# Patient Record
Sex: MALE | Race: WHITE | Employment: OTHER | URBAN - METROPOLITAN AREA
[De-identification: names, ages, dates, MRNs, and addresses within clinical notes are randomized per-mention and may not be internally consistent; named-entity substitution may affect disease eponyms.]

---

## 2018-08-17 ENCOUNTER — HOSPITAL ENCOUNTER (EMERGENCY)
Age: 58
Discharge: STILL ADMITTED OR ADMINISTRATIVE | End: 2018-08-17
Attending: EMERGENCY MEDICINE

## 2018-08-17 PROCEDURE — 75810000275 HC EMERGENCY DEPT VISIT NO LEVEL OF CARE

## 2020-07-13 ENCOUNTER — HOSPITAL ENCOUNTER (EMERGENCY)
Age: 60
Discharge: HOME | End: 2020-07-14
Attending: EMERGENCY MEDICINE
Payer: SELF-PAY

## 2020-07-13 VITALS
OXYGEN SATURATION: 97 % | HEART RATE: 78 BPM | RESPIRATION RATE: 14 BRPM | TEMPERATURE: 98.7 F | SYSTOLIC BLOOD PRESSURE: 159 MMHG | DIASTOLIC BLOOD PRESSURE: 92 MMHG

## 2020-07-13 DIAGNOSIS — R19.7 DIARRHEA, UNSPECIFIED TYPE: Primary | ICD-10-CM

## 2020-07-13 LAB
BASOPHILS # BLD: 0.1 K/UL (ref 0–0.1)
BASOPHILS NFR BLD: 1 % (ref 0–2)
DIFFERENTIAL METHOD BLD: NORMAL
EOSINOPHIL # BLD: 0.1 K/UL (ref 0–0.4)
EOSINOPHIL NFR BLD: 2 % (ref 0–5)
ERYTHROCYTE [DISTWIDTH] IN BLOOD BY AUTOMATED COUNT: 13.1 % (ref 11.6–14.5)
HCT VFR BLD AUTO: 45 % (ref 36–48)
HGB BLD-MCNC: 15.8 G/DL (ref 13–16)
LYMPHOCYTES # BLD: 1.9 K/UL (ref 0.9–3.6)
LYMPHOCYTES NFR BLD: 30 % (ref 21–52)
MCH RBC QN AUTO: 31.3 PG (ref 24–34)
MCHC RBC AUTO-ENTMCNC: 35.1 G/DL (ref 31–37)
MCV RBC AUTO: 89.1 FL (ref 74–97)
MONOCYTES # BLD: 0.5 K/UL (ref 0.05–1.2)
MONOCYTES NFR BLD: 8 % (ref 3–10)
NEUTS SEG # BLD: 3.9 K/UL (ref 1.8–8)
NEUTS SEG NFR BLD: 59 % (ref 40–73)
PLATELET # BLD AUTO: 145 K/UL (ref 135–420)
PMV BLD AUTO: 10.8 FL (ref 9.2–11.8)
RBC # BLD AUTO: 5.05 M/UL (ref 4.7–5.5)
WBC # BLD AUTO: 6.6 K/UL (ref 4.6–13.2)

## 2020-07-13 PROCEDURE — 99282 EMERGENCY DEPT VISIT SF MDM: CPT

## 2020-07-13 PROCEDURE — 83735 ASSAY OF MAGNESIUM: CPT

## 2020-07-13 PROCEDURE — 80053 COMPREHEN METABOLIC PANEL: CPT

## 2020-07-13 PROCEDURE — 74011250636 HC RX REV CODE- 250/636: Performed by: EMERGENCY MEDICINE

## 2020-07-13 PROCEDURE — 83690 ASSAY OF LIPASE: CPT

## 2020-07-13 PROCEDURE — 85025 COMPLETE CBC W/AUTO DIFF WBC: CPT

## 2020-07-13 PROCEDURE — 96360 HYDRATION IV INFUSION INIT: CPT

## 2020-07-13 RX ADMIN — SODIUM CHLORIDE 1000 ML: 900 INJECTION, SOLUTION INTRAVENOUS at 23:58

## 2020-07-14 LAB
ALBUMIN SERPL-MCNC: 3.5 G/DL (ref 3.4–5)
ALBUMIN/GLOB SERPL: 1 {RATIO} (ref 0.8–1.7)
ALP SERPL-CCNC: 130 U/L (ref 45–117)
ALT SERPL-CCNC: 21 U/L (ref 16–61)
ANION GAP SERPL CALC-SCNC: 11 MMOL/L (ref 3–18)
AST SERPL-CCNC: 17 U/L (ref 10–38)
BILIRUB SERPL-MCNC: 0.8 MG/DL (ref 0.2–1)
BUN SERPL-MCNC: 15 MG/DL (ref 7–18)
BUN/CREAT SERPL: 20 (ref 12–20)
CALCIUM SERPL-MCNC: 8.8 MG/DL (ref 8.5–10.1)
CHLORIDE SERPL-SCNC: 102 MMOL/L (ref 100–111)
CO2 SERPL-SCNC: 23 MMOL/L (ref 21–32)
CREAT SERPL-MCNC: 0.76 MG/DL (ref 0.6–1.3)
GLOBULIN SER CALC-MCNC: 3.6 G/DL (ref 2–4)
GLUCOSE SERPL-MCNC: 293 MG/DL (ref 74–99)
LIPASE SERPL-CCNC: 133 U/L (ref 73–393)
MAGNESIUM SERPL-MCNC: 2 MG/DL (ref 1.6–2.6)
POTASSIUM SERPL-SCNC: 4.3 MMOL/L (ref 3.5–5.5)
PROT SERPL-MCNC: 7.1 G/DL (ref 6.4–8.2)
SODIUM SERPL-SCNC: 136 MMOL/L (ref 136–145)

## 2020-07-14 PROCEDURE — 74011636637 HC RX REV CODE- 636/637: Performed by: EMERGENCY MEDICINE

## 2020-07-14 PROCEDURE — 96361 HYDRATE IV INFUSION ADD-ON: CPT

## 2020-07-14 RX ORDER — METFORMIN HYDROCHLORIDE 1000 MG/1
1000 TABLET ORAL 2 TIMES DAILY WITH MEALS
Qty: 30 TAB | Refills: 0 | Status: SHIPPED | OUTPATIENT
Start: 2020-07-14 | End: 2020-07-29

## 2020-07-14 RX ORDER — LISINOPRIL 20 MG/1
20 TABLET ORAL DAILY
Qty: 15 TAB | Refills: 0 | Status: SHIPPED | OUTPATIENT
Start: 2020-07-14 | End: 2020-07-29

## 2020-07-14 RX ORDER — GLIPIZIDE 10 MG/1
10 TABLET ORAL 2 TIMES DAILY
Qty: 30 TAB | Refills: 0 | Status: SHIPPED | OUTPATIENT
Start: 2020-07-14 | End: 2020-07-29

## 2020-07-14 RX ADMIN — HUMAN INSULIN 2 UNITS: 100 INJECTION, SOLUTION SUBCUTANEOUS at 00:59

## 2020-07-14 NOTE — ED PROVIDER NOTES
[facility] DEPARTMENT HISTORY AND PHYSICAL EXAM    11:55 PM  Date: 7/13/2020  Patient Name: Clint Madera [facility]    History of Presenting Illness     Chief Complaint   Patient presents with    Diarrhea        History Provided By: Patient    HPI: Sugey Cramer is a 61 y.o. male with history of diabetes and hypertension. Patient is homeless and has been off of his medication for about 3 months. He is here today complaining of diarrhea for the past 2 days. Abdominal cramp associated with bowel movements. Otherwise denies abdominal pain. No history of nausea or vomiting. No fever or cough. Denies known sick contacts. He tried to go to the community clinic to get his medication refills but it was so busy that he got very anxious and could not stay. Location:  Severity:  Timing/course: Onset/Duration:     PCP: Dot Love MD    Past History     Past Medical History:  Past Medical History:   Diagnosis Date    Diabetes ([address]     Dystonia     Dystonia     Hypertension        Past Surgical History:  History reviewed. No pertinent surgical history. Family History:  History reviewed. No pertinent family history. Social History:  Social History     Tobacco Use    Smoking status: Never Smoker    Smokeless tobacco: Never Used   Substance Use Topics    Alcohol use: Not Currently    Drug use: Never       Allergies: Allergies   Allergen Reactions    Morphine Rash       Review of Systems   Review of Systems   Gastrointestinal: Positive for abdominal pain and diarrhea. All other systems reviewed and are negative. Physical Exam     Patient Vitals for the past 12 hrs:   Temp Pulse Resp BP SpO2   07/13/20 2257 98.7 °F (37.1 °C) 78 14 (!) 159/92 97 %       Physical Exam  Vitals signs and nursing note reviewed. Constitutional:       Appearance: Normal appearance. He is obese. HENT:      Head: Normocephalic and atraumatic. Eyes:      Extraocular Movements: Extraocular movements intact. Neck:      Musculoskeletal: Normal range of motion and neck supple. Cardiovascular:      Rate and Rhythm: Normal rate. Pulmonary:      Effort: Pulmonary effort is normal. No respiratory distress. Abdominal:      General: There is no distension. Palpations: Abdomen is soft. Tenderness: There is no abdominal tenderness. Musculoskeletal: Normal range of motion. General: No deformity. Skin:     General: Skin is warm and dry. Neurological:      General: No focal deficit present. Mental Status: He is alert and oriented to person, place, and time. Psychiatric:         Mood and Affect: Mood normal.         Behavior: Behavior normal.         Diagnostic Study Results     Labs -  Recent Results (from the past 12 hour(s))   CBC WITH AUTOMATED DIFF    Collection Time: 07/13/20 11:30 PM   Result Value Ref Range    WBC 6.6 4.6 - 13.2 K/uL    RBC 5.05 4.70 - 5.50 M/uL    HGB 15.8 13.0 - 16.0 g/dL    HCT 45.0 36.0 - 48.0 %    MCV 89.1 74.0 - 97.0 FL    MCH 31.3 24.0 - 34.0 PG    MCHC 35.1 31.0 - 37.0 g/dL    RDW 13.1 11.6 - 14.5 %    PLATELET 760 414 - 553 K/uL    MPV 10.8 9.2 - 11.8 FL    NEUTROPHILS 59 40 - 73 %    LYMPHOCYTES 30 21 - 52 %    MONOCYTES 8 3 - 10 %    EOSINOPHILS 2 0 - 5 %    BASOPHILS 1 0 - 2 %    ABS. NEUTROPHILS 3.9 1.8 - 8.0 K/UL    ABS. LYMPHOCYTES 1.9 0.9 - 3.6 K/UL    ABS. MONOCYTES 0.5 0.05 - 1.2 K/UL    ABS. EOSINOPHILS 0.1 0.0 - 0.4 K/UL    ABS. BASOPHILS 0.1 0.0 - 0.1 K/UL    DF AUTOMATED         Radiologic Studies -   No results found. Medical Decision Making     ED Course: Progress Notes, Reevaluation, and Consults:    11:55 PM Initial assessment performed. The patients presenting problems have been discussed, and they/their family are in agreement with the care plan formulated and outlined with them. I have encouraged them to ask questions as they arise throughout their visit.         Provider Notes (Medical Decision Making): 54-year-old male history of diabetes and hypertension presenting with diarrhea and abdominal cramps for 2 days. Been off of his medications for 3 months. He is well-appearing on exam with a benign abdomen. Could be viral infection versus hyperglycemia due to medication noncompliance. Low suspicion for COVID-19 due to the lack of other symptoms, however the patient is homeless. No testing at this point. Will get screening labs to evaluate his electrolyte and his glucose. Hydration and  consult for medication refill and follow-up. Procedures:     Critical Care Time:     Vital Signs-Reviewed the patient's vital signs. Reviewed pt's pulse ox reading. EKG: Interpreted by the EP. Time Interpreted:    Rate:    Rhythm:    Interpretation:   Comparison:     Records Reviewed: Nursing Notes (Time of Review: 11:55 PM)  -I am the first provider for this patient.  -I reviewed the vital signs, available nursing notes, past medical history, past surgical history, family history and social history. Current Outpatient Medications   Medication Sig Dispense Refill    metFORMIN (GLUCOPHAGE) 1,000 mg tablet Take 1,000 mg by mouth two (2) times daily (with meals).  glipiZIDE (GLUCOTROL) 10 mg tablet Take 10 mg by mouth two (2) times a day.  pioglitazone (ACTOS) 15 mg tablet Take 15 mg by mouth.  empagliflozin (JARDIANCE) 10 mg tablet Take 10 mg by mouth daily.  hydroCHLOROthiazide (HYDRODIURIL) 25 mg tablet Take 25 mg by mouth daily.  lisinopril (PRINIVIL, ZESTRIL) 20 mg tablet Take 20 mg by mouth daily.  baclofen (LIORESAL) 10 mg tablet Take 10 mg by mouth three (3) times daily.  sertraline (ZOLOFT) 50 mg tablet Take 50 mg by mouth daily.  gabapentin (GRALISE 30-DAY STARTER PACK) 300 mg (9)- 600 mg (69) Tb24 Take  by mouth.  gabapentin (NEURONTIN) 600 mg tablet Take 600 mg by mouth two (2) times a day. Clinical Impression     Clinical Impression: No diagnosis found.     Disposition: dc          This note was dictated utilizing voice recognition software which may lead to typographical errors. I apologize in advance if the situation occurs. If questions arise please do not hesitate to contact me or call our department.     Alvina Rooney MD  11:55 PM

## 2020-07-14 NOTE — DISCHARGE INSTRUCTIONS

## 2020-07-14 NOTE — ED TRIAGE NOTES
Pt states he has had excessive diarrhea since Saturday, cramps with the diarrhea but no other symptoms, denies CP, SOB, nausea, vomiting, states he is homeless and has been out of diabetes medication and testing supplies for months.

## 2023-07-13 ENCOUNTER — HOSPITAL ENCOUNTER (EMERGENCY)
Facility: HOSPITAL | Age: 63
Discharge: HOME | End: 2023-07-14

## 2023-07-13 ENCOUNTER — APPOINTMENT (OUTPATIENT)
Facility: HOSPITAL | Age: 63
End: 2023-07-13

## 2023-07-13 VITALS
SYSTOLIC BLOOD PRESSURE: 147 MMHG | HEIGHT: 74 IN | WEIGHT: 205 LBS | BODY MASS INDEX: 26.31 KG/M2 | OXYGEN SATURATION: 100 % | RESPIRATION RATE: 14 BRPM | HEART RATE: 86 BPM | TEMPERATURE: 98 F | DIASTOLIC BLOOD PRESSURE: 88 MMHG

## 2023-07-13 DIAGNOSIS — M54.41 ACUTE BILATERAL LOW BACK PAIN WITH BILATERAL SCIATICA: ICD-10-CM

## 2023-07-13 DIAGNOSIS — M51.36 DDD (DEGENERATIVE DISC DISEASE), LUMBAR: Primary | ICD-10-CM

## 2023-07-13 DIAGNOSIS — M54.42 ACUTE BILATERAL LOW BACK PAIN WITH BILATERAL SCIATICA: ICD-10-CM

## 2023-07-13 PROCEDURE — 72100 X-RAY EXAM L-S SPINE 2/3 VWS: CPT

## 2023-07-13 PROCEDURE — 6360000002 HC RX W HCPCS: Performed by: EMERGENCY MEDICINE

## 2023-07-13 PROCEDURE — 99284 EMERGENCY DEPT VISIT MOD MDM: CPT

## 2023-07-13 PROCEDURE — 96372 THER/PROPH/DIAG INJ SC/IM: CPT

## 2023-07-13 RX ORDER — KETOROLAC TROMETHAMINE 15 MG/ML
15 INJECTION, SOLUTION INTRAMUSCULAR; INTRAVENOUS
Status: COMPLETED | OUTPATIENT
Start: 2023-07-13 | End: 2023-07-13

## 2023-07-13 RX ADMIN — KETOROLAC TROMETHAMINE 15 MG: 15 INJECTION, SOLUTION INTRAMUSCULAR; INTRAVENOUS at 23:29

## 2023-07-13 ASSESSMENT — LIFESTYLE VARIABLES
HOW OFTEN DO YOU HAVE A DRINK CONTAINING ALCOHOL: NEVER
HOW MANY STANDARD DRINKS CONTAINING ALCOHOL DO YOU HAVE ON A TYPICAL DAY: PATIENT DOES NOT DRINK

## 2023-07-13 ASSESSMENT — ENCOUNTER SYMPTOMS: BACK PAIN: 1

## 2023-07-14 RX ORDER — LIDOCAINE 50 MG/G
1 PATCH TOPICAL DAILY
Qty: 10 PATCH | Refills: 0 | Status: SHIPPED | OUTPATIENT
Start: 2023-07-14 | End: 2023-07-24

## 2023-07-14 RX ORDER — OXYCODONE HYDROCHLORIDE AND ACETAMINOPHEN 5; 325 MG/1; MG/1
1 TABLET ORAL EVERY 6 HOURS PRN
Qty: 12 TABLET | Refills: 0 | Status: SHIPPED | OUTPATIENT
Start: 2023-07-14 | End: 2023-07-17

## 2023-07-14 RX ORDER — PREDNISONE 10 MG/1
TABLET ORAL
Qty: 21 EACH | Refills: 1 | Status: SHIPPED | OUTPATIENT
Start: 2023-07-14

## 2023-07-14 NOTE — ED PROVIDER NOTES
[facility] EMERGENCY DEPT  EMERGENCY DEPARTMENT ENCOUNTER      Pt Name: Amanda Espino  MRN: [id number]  [address] [facility] 1960  Date of evaluation: 7/13/2023  Provider: BRITANY Courtney    CHIEF COMPLAINT       Chief Complaint   Patient presents with    Back Pain         HISTORY OF PRESENT ILLNESS   (Location/Symptom, Timing/Onset, Context/Setting, Quality, Duration, Modifying Factors, Severity)  Note limiting factors. Amanda Espino is a 61 y.o. male who presents to the emergency department complaint of low back pain radiating down both legs. He has had this since about Sunday and multiple times he feels like he has just fallen suddenly because the pain is so sharp and debilitating. Denies any saddle anesthesia bowel incontinence urinary retention fever rash IV drug use. He has had cervical fusions previously but these were over 20 years ago. HPI    Nursing Notes were reviewed. REVIEW OF SYSTEMS    (2-9 systems for level 4, 10 or more for level 5)     Review of Systems   Constitutional:  Negative for fever and unexpected weight change. Genitourinary:  Negative for difficulty urinating. Musculoskeletal:  Positive for back pain and gait problem. Neurological:  Negative for weakness and numbness. Except as noted above the remainder of the review of systems was reviewed and negative. PAST MEDICAL HISTORY     Past Medical History:   Diagnosis Date    Diabetes ([address])     Dystonia     Dystonia     Hypertension          SURGICAL HISTORY     No past surgical history on file. CURRENT MEDICATIONS       Previous Medications    No medications on file       ALLERGIES     Morphine    FAMILY HISTORY     No family history on file.        SOCIAL HISTORY       Social History     Socioeconomic History    Marital status:    Tobacco Use    Smoking status: Never    Smokeless tobacco: Never   Substance and Sexual Activity    Alcohol use: Not Currently    Drug use: Never       SCREENINGS

## 2023-07-14 NOTE — ED NOTES
Pt states Monday he stood up to use the bathroom and states he experienced bilateral shooting pain down both legs and his legs almost gave out. Denies bowel or bladder incontinence.      Hx cervical fusion       Nannette Bender RN  07/13/23 2399

## 2023-07-14 NOTE — ED NOTES
Pt discharged via ambulatory to Home. Pt Verbalized understanding of discharge instructions. Vital signs stable.           Bartolo Rooney RN  07/14/23 0414

## 2023-08-08 ENCOUNTER — HOSPITAL ENCOUNTER (EMERGENCY)
Facility: HOSPITAL | Age: 63
Discharge: HOME | End: 2023-08-09
Attending: EMERGENCY MEDICINE

## 2023-08-08 VITALS
OXYGEN SATURATION: 97 % | TEMPERATURE: 97.7 F | DIASTOLIC BLOOD PRESSURE: 82 MMHG | WEIGHT: 198 LBS | BODY MASS INDEX: 25.42 KG/M2 | RESPIRATION RATE: 16 BRPM | SYSTOLIC BLOOD PRESSURE: 147 MMHG | HEART RATE: 79 BPM

## 2023-08-08 DIAGNOSIS — M54.42 ACUTE MIDLINE LOW BACK PAIN WITH BILATERAL SCIATICA: Primary | ICD-10-CM

## 2023-08-08 DIAGNOSIS — M54.41 ACUTE MIDLINE LOW BACK PAIN WITH BILATERAL SCIATICA: Primary | ICD-10-CM

## 2023-08-08 PROCEDURE — 99283 EMERGENCY DEPT VISIT LOW MDM: CPT

## 2023-08-08 ASSESSMENT — PAIN SCALES - GENERAL: PAINLEVEL_OUTOF10: 10

## 2023-08-08 ASSESSMENT — PAIN DESCRIPTION - LOCATION: LOCATION: BACK;LEG

## 2023-08-09 PROCEDURE — 6370000000 HC RX 637 (ALT 250 FOR IP): Performed by: EMERGENCY MEDICINE

## 2023-08-09 RX ORDER — OXYCODONE HYDROCHLORIDE AND ACETAMINOPHEN 5; 325 MG/1; MG/1
2 TABLET ORAL
Status: COMPLETED | OUTPATIENT
Start: 2023-08-09 | End: 2023-08-09

## 2023-08-09 RX ORDER — OXYCODONE HYDROCHLORIDE AND ACETAMINOPHEN 5; 325 MG/1; MG/1
1 TABLET ORAL EVERY 6 HOURS PRN
Qty: 8 TABLET | Refills: 0 | Status: SHIPPED | OUTPATIENT
Start: 2023-08-09 | End: 2023-08-12

## 2023-08-09 RX ADMIN — OXYCODONE AND ACETAMINOPHEN 2 TABLET: 325; 5 TABLET ORAL at 03:32

## 2023-08-09 NOTE — DISCHARGE INSTRUCTIONS
Call the [address] to establish primary care  Call to [facility] to establish primary care

## 2023-08-09 NOTE — ED PROVIDER NOTES
EMERGENCY DEPARTMENT HISTORY AND PHYSICAL EXAM    2:45 AM EDT seen at this time in super track room        Date: 8/8/2023  Patient Name: Andrew Robles [facility]    History of Presenting Illness     Chief Complaint   Patient presents with    Back Pain    Leg Pain         History Provided By: patient    Additional History (Context): Juliano Pratt is a 61 y.o. male presents with lower back pain for 5 weeks, had a fall, went down to his knees did not go any further. After a few weeks had an x-ray here that showed no acute process. He is continuing to have pain shooting down his legs and difficulty with walking. Previously he has had problems with his neck since the 1990s. But no problems with the lower back. He has no insurance, does see pain management, but requests an MRI. Does not have primary care. Kaylen Fernandes PCP: Joni Guy MD    Chief Complaint:   Duration:    Timing:    Location:   Quality:   Severity:   Modifying Factors:   Associated Symptoms:       Current Facility-Administered Medications   Medication Dose Route Frequency Provider Last Rate Last Admin    oxyCODONE-acetaminophen (PERCOCET) 5-325 MG per tablet 2 tablet  2 tablet Oral NOW Melissa Beaulieu MD         Current Outpatient Medications   Medication Sig Dispense Refill    oxyCODONE-acetaminophen (PERCOCET) 5-325 MG per tablet Take 1 tablet by mouth every 6 hours as needed for Pain for up to 3 days. Intended supply: 3 days. Take lowest dose possible to manage pain Max Daily Amount: 4 tablets 8 tablet 0    predniSONE 10 MG (21) TBPK Take 6 tablets on day 1; take 5 tablets on day 2; take 4 tablets on day 3; take 3 tablets on day 4; take 2 tablets on day 5; take 1 tablet on day 6. 21 each 1       Past History     Past Medical History:  Past Medical History:   Diagnosis Date    Diabetes ([address])     Dystonia     Dystonia     Hypertension        Past Surgical History:  History reviewed. No pertinent surgical history.     Family History:  History

## 2023-08-09 NOTE — ED NOTES
Pt d/cd to home awake, alert and in NAD. All questions answered.       Anat Bowling RN  08/09/23 2592

## 2024-01-12 ENCOUNTER — APPOINTMENT (OUTPATIENT)
Facility: HOSPITAL | Age: 64
End: 2024-01-12

## 2024-01-12 ENCOUNTER — HOSPITAL ENCOUNTER (EMERGENCY)
Facility: HOSPITAL | Age: 64
Discharge: HOME | End: 2024-01-12

## 2024-01-12 VITALS
HEART RATE: 91 BPM | WEIGHT: 215 LBS | TEMPERATURE: 97.3 F | DIASTOLIC BLOOD PRESSURE: 98 MMHG | OXYGEN SATURATION: 97 % | RESPIRATION RATE: 18 BRPM | BODY MASS INDEX: 27.59 KG/M2 | SYSTOLIC BLOOD PRESSURE: 155 MMHG | HEIGHT: 74 IN

## 2024-01-12 DIAGNOSIS — S20.211A CONTUSION OF RIB ON RIGHT SIDE, INITIAL ENCOUNTER: ICD-10-CM

## 2024-01-12 DIAGNOSIS — R16.1 SPLENOMEGALY: ICD-10-CM

## 2024-01-12 DIAGNOSIS — R91.1 PULMONARY NODULE: Primary | ICD-10-CM

## 2024-01-12 DIAGNOSIS — R16.0 HEPATOMEGALY: ICD-10-CM

## 2024-01-12 PROCEDURE — 71250 CT THORAX DX C-: CPT

## 2024-01-12 PROCEDURE — 99284 EMERGENCY DEPT VISIT MOD MDM: CPT

## 2024-01-12 PROCEDURE — 71046 X-RAY EXAM CHEST 2 VIEWS: CPT

## 2024-01-12 RX ORDER — LIDOCAINE 4 G/G
1 PATCH TOPICAL DAILY
Qty: 30 PATCH | Refills: 0 | Status: SHIPPED | OUTPATIENT
Start: 2024-01-12 | End: 2024-02-11

## 2024-01-12 RX ORDER — METHOCARBAMOL 500 MG/1
500 TABLET, FILM COATED ORAL 3 TIMES DAILY
Qty: 15 TABLET | Refills: 0 | Status: SHIPPED | OUTPATIENT
Start: 2024-01-12 | End: 2024-01-22

## 2024-01-12 ASSESSMENT — ENCOUNTER SYMPTOMS
VOMITING: 0
SHORTNESS OF BREATH: 0
COLOR CHANGE: 0
ABDOMINAL PAIN: 0
DIARRHEA: 0
RHINORRHEA: 0

## 2024-01-12 ASSESSMENT — PAIN SCALES - GENERAL: PAINLEVEL_OUTOF10: 5

## 2024-01-12 ASSESSMENT — PAIN - FUNCTIONAL ASSESSMENT: PAIN_FUNCTIONAL_ASSESSMENT: 0-10

## 2024-01-12 ASSESSMENT — PAIN DESCRIPTION - LOCATION: LOCATION: RIB CAGE

## 2024-01-12 NOTE — ED PROVIDER NOTES
EMERGENCY DEPARTMENT HISTORY AND PHYSICAL EXAM      Date: 1/12/2024  Patient Name: Henrry Martinez    History of Presenting Illness     Chief Complaint   Patient presents with    Fall    Rib Pain       History (Context): Henrry Martinez is a 63 y.o. male with significant past medical history of htn, DM presents ambulatory to the ED today.  Patient reports history of herniated disc and at times his right leg will \"give out\".  Patient reports this occurred today and he fell to his right side, hitting his right lower ribs on his toolbox.  Denies hitting head and LOC.  Denies taking blood thinners.  Patient ports increased pain with movement.  Denies CP, SOB, dizziness. Patient has not taken anything for symptoms.       PCP: Kahlil Harris MD    No current facility-administered medications for this encounter.     Current Outpatient Medications   Medication Sig Dispense Refill    lidocaine 4 % external patch Place 1 patch onto the skin daily 30 patch 0    methocarbamol (ROBAXIN) 500 MG tablet Take 1 tablet by mouth 3 times daily for 10 days 15 tablet 0    predniSONE 10 MG (21) TBPK Take 6 tablets on day 1; take 5 tablets on day 2; take 4 tablets on day 3; take 3 tablets on day 4; take 2 tablets on day 5; take 1 tablet on day 6. 21 each 1       Past History     Past Medical History:   Past Medical History:   Diagnosis Date    Diabetes (HCC)     Dystonia     Dystonia     Hypertension        Past Surgical History:  No past surgical history on file.    Family History:  No family history on file.    Social History:   Social History     Tobacco Use    Smoking status: Never    Smokeless tobacco: Never   Substance Use Topics    Alcohol use: Not Currently    Drug use: Never       Allergies:  Allergies   Allergen Reactions    Morphine Hives       PMH, PSH, family history, social history, allergies reviewed with the patient with significant items noted above.  Review of Systems   Review of Systems   Constitutional:   for lung CA.      2.  Hepatomegaly and splenomegaly.      XR CHEST (2 VW)    (Results Pending)         The laboratory results, imaging results, and other diagnostic exams were reviewed in the EMR.    Medical Decision Making   I am the first provider for this patient.    I reviewed the vital signs, available nursing notes, past medical history, past surgical history, family history and social history.    Vital Signs-Reviewed the patient's vital signs.         Records Reviewed: Personally, on initial evaluation    MDM:   DDX includes but is not limited to: Rib contusion vs fracture, Pneumothorax    Will obtain lab work and imaging for further evaluation of patients complaint. Will continue to monitor and evaluate patient while in the ED.      Orders as below:  Orders Placed This Encounter   Procedures    XR CHEST (2 VW)    CT CHEST WO CONTRAST        Henrry Martinez presents with complaint of right rib pain after trauma PTA.  On exam no overlying skin changes or deformity.  Normal oxygen saturation.  Denies hitting head and LOC.  Discussed incidental findings on CT scan with patient.  No rib fracture and will treat for rib contusion. At time of discharge, pt non-toxic appearing in NAD. Pt stable for prompt outpatient follow-up with PCP 1 to 2 days.  Patient given strict instructions to return if symptoms worsen.        ED Course:          Procedures:  Procedures         Documentation/Prior Results Review:  Old medical records.  Nursing notes.        Diagnosis and Disposition     CLINICAL IMPRESSION:  1. Pulmonary nodule    2. Hepatomegaly    3. Splenomegaly    4. Contusion of rib on right side, initial encounter         Medication List        START taking these medications      lidocaine 4 % external patch  Place 1 patch onto the skin daily     methocarbamol 500 MG tablet  Commonly known as: ROBAXIN  Take 1 tablet by mouth 3 times daily for 10 days            ASK your doctor about these medications

## 2024-01-12 NOTE — ED TRIAGE NOTES
Pt reports his leg gave out this am and he pushed himself off the truck landing on something hard. Pt c/o of right rib pain, ambulatory with cane to triage.

## 2024-01-26 ENCOUNTER — APPOINTMENT (OUTPATIENT)
Facility: HOSPITAL | Age: 64
End: 2024-01-26

## 2024-01-26 ENCOUNTER — HOSPITAL ENCOUNTER (EMERGENCY)
Facility: HOSPITAL | Age: 64
Discharge: HOME | End: 2024-01-26

## 2024-01-26 VITALS
SYSTOLIC BLOOD PRESSURE: 139 MMHG | RESPIRATION RATE: 16 BRPM | TEMPERATURE: 97.8 F | OXYGEN SATURATION: 98 % | WEIGHT: 217 LBS | DIASTOLIC BLOOD PRESSURE: 78 MMHG | BODY MASS INDEX: 27.85 KG/M2 | HEIGHT: 74 IN | HEART RATE: 82 BPM

## 2024-01-26 DIAGNOSIS — R39.11 URINARY HESITANCY: ICD-10-CM

## 2024-01-26 DIAGNOSIS — R73.9 HYPERGLYCEMIA: ICD-10-CM

## 2024-01-26 DIAGNOSIS — R60.0 LEG EDEMA: Primary | ICD-10-CM

## 2024-01-26 LAB
ALBUMIN SERPL-MCNC: 3.5 G/DL (ref 3.4–5)
ALBUMIN/GLOB SERPL: 1.1 (ref 0.8–1.7)
ALP SERPL-CCNC: 127 U/L (ref 45–117)
ALT SERPL-CCNC: 21 U/L (ref 16–61)
ANION GAP SERPL CALC-SCNC: 6 MMOL/L (ref 3–18)
APPEARANCE UR: CLEAR
AST SERPL-CCNC: 10 U/L (ref 10–38)
BACTERIA URNS QL MICRO: ABNORMAL /HPF
BASOPHILS # BLD: 0.1 K/UL (ref 0–0.1)
BASOPHILS NFR BLD: 1 % (ref 0–2)
BILIRUB SERPL-MCNC: 1.2 MG/DL (ref 0.2–1)
BILIRUB UR QL: NEGATIVE
BUN SERPL-MCNC: 22 MG/DL (ref 7–18)
BUN/CREAT SERPL: 29 (ref 12–20)
CALCIUM SERPL-MCNC: 9.2 MG/DL (ref 8.5–10.1)
CHLORIDE SERPL-SCNC: 104 MMOL/L (ref 100–111)
CO2 SERPL-SCNC: 25 MMOL/L (ref 21–32)
COLOR UR: YELLOW
CREAT SERPL-MCNC: 0.76 MG/DL (ref 0.6–1.3)
DIFFERENTIAL METHOD BLD: ABNORMAL
EOSINOPHIL # BLD: 0.1 K/UL (ref 0–0.4)
EOSINOPHIL NFR BLD: 1 % (ref 0–5)
EPITH CASTS URNS QL MICRO: ABNORMAL /LPF (ref 0–5)
ERYTHROCYTE [DISTWIDTH] IN BLOOD BY AUTOMATED COUNT: 13 % (ref 11.6–14.5)
GLOBULIN SER CALC-MCNC: 3.3 G/DL (ref 2–4)
GLUCOSE SERPL-MCNC: 331 MG/DL (ref 74–99)
GLUCOSE UR STRIP.AUTO-MCNC: >1000 MG/DL
HCT VFR BLD AUTO: 45.4 % (ref 36–48)
HGB BLD-MCNC: 16 G/DL (ref 13–16)
HGB UR QL STRIP: NEGATIVE
IMM GRANULOCYTES # BLD AUTO: 0.1 K/UL (ref 0–0.04)
IMM GRANULOCYTES NFR BLD AUTO: 2 % (ref 0–0.5)
KETONES UR QL STRIP.AUTO: 80 MG/DL
LEUKOCYTE ESTERASE UR QL STRIP.AUTO: NEGATIVE
LIPASE SERPL-CCNC: 40 U/L (ref 13–75)
LYMPHOCYTES # BLD: 1.5 K/UL (ref 0.9–3.6)
LYMPHOCYTES NFR BLD: 22 % (ref 21–52)
MCH RBC QN AUTO: 30.6 PG (ref 24–34)
MCHC RBC AUTO-ENTMCNC: 35.2 G/DL (ref 31–37)
MCV RBC AUTO: 86.8 FL (ref 78–100)
MONOCYTES # BLD: 0.4 K/UL (ref 0.05–1.2)
MONOCYTES NFR BLD: 6 % (ref 3–10)
NEUTS SEG # BLD: 4.5 K/UL (ref 1.8–8)
NEUTS SEG NFR BLD: 67 % (ref 40–73)
NITRITE UR QL STRIP.AUTO: NEGATIVE
NRBC # BLD: 0 K/UL (ref 0–0.01)
NRBC BLD-RTO: 0 PER 100 WBC
NT PRO BNP: 143 PG/ML (ref 0–900)
PH UR STRIP: 5.5 (ref 5–8)
PLATELET # BLD AUTO: 144 K/UL (ref 135–420)
PMV BLD AUTO: 9.7 FL (ref 9.2–11.8)
POTASSIUM SERPL-SCNC: 4.3 MMOL/L (ref 3.5–5.5)
PROT SERPL-MCNC: 6.8 G/DL (ref 6.4–8.2)
PROT UR STRIP-MCNC: 100 MG/DL
RBC # BLD AUTO: 5.23 M/UL (ref 4.35–5.65)
RBC #/AREA URNS HPF: ABNORMAL /HPF (ref 0–5)
SODIUM SERPL-SCNC: 135 MMOL/L (ref 136–145)
SP GR UR REFRACTOMETRY: >1.03 (ref 1–1.03)
TROPONIN I SERPL HS-MCNC: 6 NG/L (ref 0–78)
UROBILINOGEN UR QL STRIP.AUTO: 0.2 EU/DL (ref 0.2–1)
WBC # BLD AUTO: 6.7 K/UL (ref 4.6–13.2)
WBC URNS QL MICRO: ABNORMAL /HPF (ref 0–4)

## 2024-01-26 PROCEDURE — 71046 X-RAY EXAM CHEST 2 VIEWS: CPT

## 2024-01-26 PROCEDURE — 99285 EMERGENCY DEPT VISIT HI MDM: CPT

## 2024-01-26 PROCEDURE — 93005 ELECTROCARDIOGRAM TRACING: CPT | Performed by: PHYSICIAN ASSISTANT

## 2024-01-26 PROCEDURE — 85025 COMPLETE CBC W/AUTO DIFF WBC: CPT

## 2024-01-26 PROCEDURE — 84484 ASSAY OF TROPONIN QUANT: CPT

## 2024-01-26 PROCEDURE — 51798 US URINE CAPACITY MEASURE: CPT

## 2024-01-26 PROCEDURE — 80053 COMPREHEN METABOLIC PANEL: CPT

## 2024-01-26 PROCEDURE — 83880 ASSAY OF NATRIURETIC PEPTIDE: CPT

## 2024-01-26 PROCEDURE — 81001 URINALYSIS AUTO W/SCOPE: CPT

## 2024-01-26 PROCEDURE — 83690 ASSAY OF LIPASE: CPT

## 2024-01-26 PROCEDURE — 6360000004 HC RX CONTRAST MEDICATION: Performed by: PHYSICIAN ASSISTANT

## 2024-01-26 PROCEDURE — 74177 CT ABD & PELVIS W/CONTRAST: CPT

## 2024-01-26 RX ORDER — GABAPENTIN 900 MG/1
1800 TABLET, FILM COATED ORAL DAILY
COMMUNITY

## 2024-01-26 RX ORDER — LISINOPRIL 5 MG/1
5 TABLET ORAL DAILY
COMMUNITY

## 2024-01-26 RX ORDER — SERTRALINE HCL 100 MG
200 TABLET ORAL
COMMUNITY

## 2024-01-26 RX ORDER — TIZANIDINE 4 MG/1
TABLET ORAL
COMMUNITY
Start: 2024-01-20

## 2024-01-26 RX ADMIN — IOPAMIDOL 100 ML: 612 INJECTION, SOLUTION INTRAVENOUS at 17:31

## 2024-01-26 ASSESSMENT — ENCOUNTER SYMPTOMS
ABDOMINAL PAIN: 0
SHORTNESS OF BREATH: 0
VOMITING: 1
DIARRHEA: 0

## 2024-01-26 NOTE — ED PROVIDER NOTES
[facility] MEDICINE  [address] [address]  [phone]  Schedule an appointment as soon as possible for a visit       Urology of [address] [address] [phone]  Schedule an appointment as soon as possible for a visit             Medication List        ASK your doctor about these medications      Gralise 900 MG Tabs  Generic drug: Gabapentin (Once-Daily)     lidocaine 4 % external patch  Place 1 patch onto the skin daily     lisinopril 5 MG tablet  Commonly known as: PRINIVIL;ZESTRIL     metFORMIN 1000 MG tablet  Commonly known as: GLUCOPHAGE     tiZANidine 4 MG tablet  Commonly known as: ZANAFLEX     Zoloft 100 MG tablet  Generic drug: sertraline               Dictation disclaimer:  Please note that this dictation was completed with Captify, the computer voice recognition software.  Quite often unanticipated grammatical, syntax, homophones, and other interpretive errors are inadvertently transcribed by the computer software.  Please disregard these errors.  Please excuse any errors that have escaped final proofreading.       Brook Portillo PA  01/27/24 0859

## 2024-01-26 NOTE — ED TRIAGE NOTES
Patient to triage c/o BLE swelling, vomiting and decreased urine output that started yesterday. Denies CP or SOB.

## 2024-01-27 LAB
EKG ATRIAL RATE: 92 BPM
EKG DIAGNOSIS: NORMAL
EKG P AXIS: 66 DEGREES
EKG P-R INTERVAL: 152 MS
EKG Q-T INTERVAL: 340 MS
EKG QRS DURATION: 76 MS
EKG QTC CALCULATION (BAZETT): 420 MS
EKG R AXIS: 37 DEGREES
EKG T AXIS: 58 DEGREES
EKG VENTRICULAR RATE: 92 BPM

## 2024-01-27 PROCEDURE — 93010 ELECTROCARDIOGRAM REPORT: CPT | Performed by: INTERNAL MEDICINE

## 2024-01-27 NOTE — DISCHARGE INSTRUCTIONS
Follow-up with your primary care physician within 2 days for reassessment. Bring the results from this visit with you for their review. Return to the ED immediately for any new, worsening, or persistent symptoms, including fever, vomiting, or any other medical concerns.

## 2024-10-05 ENCOUNTER — APPOINTMENT (OUTPATIENT)
Facility: HOSPITAL | Age: 64
End: 2024-10-05

## 2024-10-05 ENCOUNTER — HOSPITAL ENCOUNTER (EMERGENCY)
Facility: HOSPITAL | Age: 64
Discharge: HOME | End: 2024-10-05
Attending: EMERGENCY MEDICINE

## 2024-10-05 VITALS
HEIGHT: 74 IN | HEART RATE: 90 BPM | RESPIRATION RATE: 18 BRPM | BODY MASS INDEX: 26.95 KG/M2 | OXYGEN SATURATION: 97 % | SYSTOLIC BLOOD PRESSURE: 134 MMHG | WEIGHT: 210 LBS | DIASTOLIC BLOOD PRESSURE: 67 MMHG | TEMPERATURE: 97.9 F

## 2024-10-05 DIAGNOSIS — L03.116 CELLULITIS OF LEFT LOWER EXTREMITY: Primary | ICD-10-CM

## 2024-10-05 PROCEDURE — 73590 X-RAY EXAM OF LOWER LEG: CPT

## 2024-10-05 PROCEDURE — 6370000000 HC RX 637 (ALT 250 FOR IP): Performed by: EMERGENCY MEDICINE

## 2024-10-05 PROCEDURE — 99283 EMERGENCY DEPT VISIT LOW MDM: CPT

## 2024-10-05 RX ORDER — CEPHALEXIN 500 MG/1
500 CAPSULE ORAL 4 TIMES DAILY
Qty: 28 CAPSULE | Refills: 0 | Status: SHIPPED | OUTPATIENT
Start: 2024-10-05 | End: 2024-10-12

## 2024-10-05 RX ORDER — SULFAMETHOXAZOLE/TRIMETHOPRIM 800-160 MG
1 TABLET ORAL EVERY 12 HOURS SCHEDULED
Status: DISCONTINUED | OUTPATIENT
Start: 2024-10-05 | End: 2024-10-05 | Stop reason: HOSPADM

## 2024-10-05 RX ORDER — SULFAMETHOXAZOLE/TRIMETHOPRIM 800-160 MG
1 TABLET ORAL 2 TIMES DAILY
Qty: 14 TABLET | Refills: 0 | Status: SHIPPED | OUTPATIENT
Start: 2024-10-05 | End: 2024-10-12

## 2024-10-05 RX ADMIN — SULFAMETHOXAZOLE AND TRIMETHOPRIM 1 TABLET: 800; 160 TABLET ORAL at 17:58

## 2024-10-05 RX ADMIN — CEPHALEXIN 500 MG: 250 CAPSULE ORAL at 17:58

## 2024-10-05 ASSESSMENT — PAIN SCALES - GENERAL: PAINLEVEL_OUTOF10: 8

## 2024-10-05 ASSESSMENT — PAIN DESCRIPTION - LOCATION: LOCATION: LEG

## 2024-10-05 ASSESSMENT — PAIN - FUNCTIONAL ASSESSMENT: PAIN_FUNCTIONAL_ASSESSMENT: 0-10

## 2024-10-05 ASSESSMENT — PAIN DESCRIPTION - ORIENTATION: ORIENTATION: LEFT

## 2024-10-05 NOTE — ED TRIAGE NOTES
Patient was at landfill taking load of debris and was hit to left shin with 6x6. Now with pain with ambulation, redness and swelling.

## 2024-10-15 NOTE — ED PROVIDER NOTES
[facility] EMERGENCY DEPT  EMERGENCY DEPARTMENT ENCOUNTER      Pt Name: Henrry Mratinez  MRN: [id number]  Birthdate 1960  Date of evaluation: 10/5/2024  Provider: HAWA GUAMAN MD  1:01 AM    CHIEF COMPLAINT       Chief Complaint   Patient presents with    Leg Injury    Leg Pain         HISTORY OF PRESENT ILLNESS    Henrry Martinez is a 64 y.o. male who presents to the emergency department     64-year-old male past medical history of diabetes and hypertension presents emergency department with left shin injury.  This occurred at work.  6 x 6 hit him in the left shin.  Will treat with medications as open area on his leg.    The history is provided by the patient. No  was used.       Nursing Notes were reviewed.    REVIEW OF SYSTEMS       Review of Systems   Skin:  Positive for wound.       Except as noted above the remainder of the review of systems was reviewed and negative.       PAST MEDICAL HISTORY     Past Medical History:   Diagnosis Date    Diabetes (HCC)     Dystonia     Dystonia     Hypertension          SURGICAL HISTORY     No past surgical history on file.      CURRENT MEDICATIONS       Discharge Medication List as of 10/5/2024  5:58 PM        CONTINUE these medications which have NOT CHANGED    Details   ZOLOFT 100 MG tablet Take 2 tablets by mouth, DAWHistorical Med      tiZANidine (ZANAFLEX) 4 MG tablet Historical Med      metFORMIN (GLUCOPHAGE) 1000 MG tablet Take 1 tablet by mouth 2 times daily (with meals)Historical Med      Gabapentin, Once-Daily, (GRALISE) 900 MG TABS Take 1,800 mg by mouth dailyHistorical Med      lisinopril (PRINIVIL;ZESTRIL) 5 MG tablet Take 1 tablet by mouth dailyHistorical Med             ALLERGIES     Morphine    FAMILY HISTORY     No family history on file.       SOCIAL HISTORY       Social History     Socioeconomic History    Marital status:    Tobacco Use    Smoking status: Never    Smokeless tobacco: Never   Substance and Sexual

## 2024-11-08 ENCOUNTER — HOSPITAL ENCOUNTER (INPATIENT)
Facility: HOSPITAL | Age: 64
LOS: 6 days | Discharge: HOME | DRG: 872 | End: 2024-11-16
Attending: STUDENT IN AN ORGANIZED HEALTH CARE EDUCATION/TRAINING PROGRAM | Admitting: STUDENT IN AN ORGANIZED HEALTH CARE EDUCATION/TRAINING PROGRAM

## 2024-11-08 ENCOUNTER — APPOINTMENT (OUTPATIENT)
Facility: HOSPITAL | Age: 64
DRG: 872 | End: 2024-11-08

## 2024-11-08 DIAGNOSIS — K51.00 PANCOLITIS (HCC): Primary | ICD-10-CM

## 2024-11-08 DIAGNOSIS — E11.65 HYPERGLYCEMIA DUE TO DIABETES MELLITUS (HCC): ICD-10-CM

## 2024-11-08 DIAGNOSIS — E86.0 DEHYDRATION: ICD-10-CM

## 2024-11-08 DIAGNOSIS — R19.7 DIARRHEA, UNSPECIFIED TYPE: ICD-10-CM

## 2024-11-08 PROBLEM — I10 HYPERTENSION: Status: ACTIVE | Noted: 2024-11-08

## 2024-11-08 PROBLEM — K52.9 COLITIS: Status: ACTIVE | Noted: 2024-11-08

## 2024-11-08 PROBLEM — G24.9 DYSTONIA: Status: ACTIVE | Noted: 2024-11-08

## 2024-11-08 PROBLEM — E11.9 TYPE 2 DIABETES MELLITUS, WITHOUT LONG-TERM CURRENT USE OF INSULIN (HCC): Status: ACTIVE | Noted: 2024-11-08

## 2024-11-08 PROBLEM — F32.A DEPRESSION: Chronic | Status: ACTIVE | Noted: 2024-11-08

## 2024-11-08 PROBLEM — F32.A DEPRESSION: Status: ACTIVE | Noted: 2024-11-08

## 2024-11-08 PROBLEM — R16.2 HEPATOSPLENOMEGALY: Status: ACTIVE | Noted: 2024-11-08

## 2024-11-08 PROBLEM — E11.9 TYPE 2 DIABETES MELLITUS, WITHOUT LONG-TERM CURRENT USE OF INSULIN (HCC): Chronic | Status: ACTIVE | Noted: 2024-11-08

## 2024-11-08 PROBLEM — G24.9 DYSTONIA: Chronic | Status: ACTIVE | Noted: 2024-11-08

## 2024-11-08 PROBLEM — R11.2 NAUSEA AND VOMITING: Status: ACTIVE | Noted: 2024-11-08

## 2024-11-08 PROBLEM — I10 HYPERTENSION: Chronic | Status: ACTIVE | Noted: 2024-11-08

## 2024-11-08 PROBLEM — K52.9 COLITIS: Status: RESOLVED | Noted: 2024-11-08 | Resolved: 2024-11-08

## 2024-11-08 LAB
ALBUMIN SERPL-MCNC: 3.4 G/DL (ref 3.4–5)
ALBUMIN/GLOB SERPL: 0.9 (ref 0.8–1.7)
ALP SERPL-CCNC: 81 U/L (ref 45–117)
ALT SERPL-CCNC: 21 U/L (ref 16–61)
ANION GAP SERPL CALC-SCNC: 14 MMOL/L (ref 3–18)
APPEARANCE UR: CLEAR
AST SERPL-CCNC: 12 U/L (ref 10–38)
BACTERIA URNS QL MICRO: ABNORMAL /HPF
BASOPHILS # BLD: 0.1 K/UL (ref 0–0.1)
BASOPHILS NFR BLD: 1 % (ref 0–2)
BILIRUB SERPL-MCNC: 1 MG/DL (ref 0.2–1)
BILIRUB UR QL: NEGATIVE
BUN SERPL-MCNC: 22 MG/DL (ref 7–18)
BUN/CREAT SERPL: 21 (ref 12–20)
CALCIUM SERPL-MCNC: 8.9 MG/DL (ref 8.5–10.1)
CHLORIDE SERPL-SCNC: 95 MMOL/L (ref 100–111)
CO2 SERPL-SCNC: 19 MMOL/L (ref 21–32)
COLOR UR: YELLOW
CREAT SERPL-MCNC: 1.04 MG/DL (ref 0.6–1.3)
DIFFERENTIAL METHOD BLD: ABNORMAL
EKG ATRIAL RATE: 104 BPM
EKG DIAGNOSIS: NORMAL
EKG P AXIS: 68 DEGREES
EKG P-R INTERVAL: 150 MS
EKG Q-T INTERVAL: 328 MS
EKG QRS DURATION: 72 MS
EKG QTC CALCULATION (BAZETT): 431 MS
EKG R AXIS: 29 DEGREES
EKG T AXIS: 86 DEGREES
EKG VENTRICULAR RATE: 104 BPM
EOSINOPHIL # BLD: 0 K/UL (ref 0–0.4)
EOSINOPHIL NFR BLD: 0 % (ref 0–5)
EPITH CASTS URNS QL MICRO: ABNORMAL /LPF (ref 0–5)
ERYTHROCYTE [DISTWIDTH] IN BLOOD BY AUTOMATED COUNT: 13.2 % (ref 11.6–14.5)
FLUAV RNA SPEC QL NAA+PROBE: NOT DETECTED
FLUBV RNA SPEC QL NAA+PROBE: NOT DETECTED
GLOBULIN SER CALC-MCNC: 3.9 G/DL (ref 2–4)
GLUCOSE SERPL-MCNC: 395 MG/DL (ref 74–99)
GLUCOSE UR STRIP.AUTO-MCNC: >1000 MG/DL
HCT VFR BLD AUTO: 50.8 % (ref 36–48)
HGB BLD-MCNC: 17.7 G/DL (ref 13–16)
HGB UR QL STRIP: NEGATIVE
IMM GRANULOCYTES # BLD AUTO: 0.2 K/UL (ref 0–0.04)
IMM GRANULOCYTES NFR BLD AUTO: 2 % (ref 0–0.5)
KETONES UR QL STRIP.AUTO: >160 MG/DL
LEUKOCYTE ESTERASE UR QL STRIP.AUTO: NEGATIVE
LIPASE SERPL-CCNC: 24 U/L (ref 13–75)
LYMPHOCYTES # BLD: 1.7 K/UL (ref 0.9–3.6)
LYMPHOCYTES NFR BLD: 15 % (ref 21–52)
MAGNESIUM SERPL-MCNC: 2.2 MG/DL (ref 1.6–2.6)
MCH RBC QN AUTO: 30.6 PG (ref 24–34)
MCHC RBC AUTO-ENTMCNC: 34.8 G/DL (ref 31–37)
MCV RBC AUTO: 87.7 FL (ref 78–100)
MONOCYTES # BLD: 0.6 K/UL (ref 0.05–1.2)
MONOCYTES NFR BLD: 5 % (ref 3–10)
MUCOUS THREADS URNS QL MICRO: ABNORMAL /LPF
NEUTS SEG # BLD: 8.4 K/UL (ref 1.8–8)
NEUTS SEG NFR BLD: 76 % (ref 40–73)
NITRITE UR QL STRIP.AUTO: NEGATIVE
NRBC # BLD: 0 K/UL (ref 0–0.01)
NRBC BLD-RTO: 0 PER 100 WBC
PH BLDV: 7.23 (ref 7.32–7.42)
PH UR STRIP: 5.5 (ref 5–8)
PLATELET # BLD AUTO: 235 K/UL (ref 135–420)
PMV BLD AUTO: 9.6 FL (ref 9.2–11.8)
POTASSIUM SERPL-SCNC: 4.5 MMOL/L (ref 3.5–5.5)
PROT SERPL-MCNC: 7.3 G/DL (ref 6.4–8.2)
PROT UR STRIP-MCNC: 30 MG/DL
RBC # BLD AUTO: 5.79 M/UL (ref 4.35–5.65)
RBC #/AREA URNS HPF: ABNORMAL /HPF (ref 0–5)
SARS-COV-2 RNA RESP QL NAA+PROBE: NOT DETECTED
SODIUM SERPL-SCNC: 128 MMOL/L (ref 136–145)
SOURCE: NORMAL
SP GR UR REFRACTOMETRY: >1.03 (ref 1–1.04)
UROBILINOGEN UR QL STRIP.AUTO: 0.2 EU/DL (ref 0.2–1)
WBC # BLD AUTO: 11 K/UL (ref 4.6–13.2)
WBC URNS QL MICRO: ABNORMAL /HPF (ref 0–5)

## 2024-11-08 PROCEDURE — 82800 BLOOD PH: CPT

## 2024-11-08 PROCEDURE — 96361 HYDRATE IV INFUSION ADD-ON: CPT

## 2024-11-08 PROCEDURE — 2580000003 HC RX 258: Performed by: INTERNAL MEDICINE

## 2024-11-08 PROCEDURE — 87506 IADNA-DNA/RNA PROBE TQ 6-11: CPT

## 2024-11-08 PROCEDURE — 80053 COMPREHEN METABOLIC PANEL: CPT

## 2024-11-08 PROCEDURE — 85025 COMPLETE CBC W/AUTO DIFF WBC: CPT

## 2024-11-08 PROCEDURE — 81001 URINALYSIS AUTO W/SCOPE: CPT

## 2024-11-08 PROCEDURE — 99285 EMERGENCY DEPT VISIT HI MDM: CPT

## 2024-11-08 PROCEDURE — 87636 SARSCOV2 & INF A&B AMP PRB: CPT

## 2024-11-08 PROCEDURE — 93005 ELECTROCARDIOGRAM TRACING: CPT | Performed by: STUDENT IN AN ORGANIZED HEALTH CARE EDUCATION/TRAINING PROGRAM

## 2024-11-08 PROCEDURE — 82272 OCCULT BLD FECES 1-3 TESTS: CPT

## 2024-11-08 PROCEDURE — 96374 THER/PROPH/DIAG INJ IV PUSH: CPT

## 2024-11-08 PROCEDURE — 6360000004 HC RX CONTRAST MEDICATION: Performed by: PHYSICIAN ASSISTANT

## 2024-11-08 PROCEDURE — 6360000002 HC RX W HCPCS: Performed by: PHYSICIAN ASSISTANT

## 2024-11-08 PROCEDURE — 6370000000 HC RX 637 (ALT 250 FOR IP): Performed by: PHYSICIAN ASSISTANT

## 2024-11-08 PROCEDURE — 96375 TX/PRO/DX INJ NEW DRUG ADDON: CPT

## 2024-11-08 PROCEDURE — G0378 HOSPITAL OBSERVATION PER HR: HCPCS

## 2024-11-08 PROCEDURE — 6360000002 HC RX W HCPCS: Performed by: INTERNAL MEDICINE

## 2024-11-08 PROCEDURE — 6370000000 HC RX 637 (ALT 250 FOR IP): Performed by: INTERNAL MEDICINE

## 2024-11-08 PROCEDURE — 93010 ELECTROCARDIOGRAM REPORT: CPT | Performed by: INTERNAL MEDICINE

## 2024-11-08 PROCEDURE — 2580000003 HC RX 258: Performed by: PHYSICIAN ASSISTANT

## 2024-11-08 PROCEDURE — 83690 ASSAY OF LIPASE: CPT

## 2024-11-08 PROCEDURE — 74177 CT ABD & PELVIS W/CONTRAST: CPT

## 2024-11-08 PROCEDURE — 83735 ASSAY OF MAGNESIUM: CPT

## 2024-11-08 PROCEDURE — 99222 1ST HOSP IP/OBS MODERATE 55: CPT | Performed by: INTERNAL MEDICINE

## 2024-11-08 RX ORDER — POTASSIUM CHLORIDE 7.45 MG/ML
10 INJECTION INTRAVENOUS PRN
Status: DISCONTINUED | OUTPATIENT
Start: 2024-11-08 | End: 2024-11-16 | Stop reason: HOSPADM

## 2024-11-08 RX ORDER — MAGNESIUM SULFATE IN WATER 40 MG/ML
2000 INJECTION, SOLUTION INTRAVENOUS PRN
Status: DISCONTINUED | OUTPATIENT
Start: 2024-11-08 | End: 2024-11-16 | Stop reason: HOSPADM

## 2024-11-08 RX ORDER — ONDANSETRON 4 MG/1
4 TABLET, ORALLY DISINTEGRATING ORAL EVERY 8 HOURS PRN
Status: DISCONTINUED | OUTPATIENT
Start: 2024-11-08 | End: 2024-11-16 | Stop reason: HOSPADM

## 2024-11-08 RX ORDER — FENTANYL CITRATE 50 UG/ML
25 INJECTION, SOLUTION INTRAMUSCULAR; INTRAVENOUS
Status: COMPLETED | OUTPATIENT
Start: 2024-11-08 | End: 2024-11-08

## 2024-11-08 RX ORDER — SODIUM CHLORIDE 0.9 % (FLUSH) 0.9 %
5-40 SYRINGE (ML) INJECTION EVERY 12 HOURS SCHEDULED
Status: DISCONTINUED | OUTPATIENT
Start: 2024-11-08 | End: 2024-11-16 | Stop reason: HOSPADM

## 2024-11-08 RX ORDER — 0.9 % SODIUM CHLORIDE 0.9 %
1000 INTRAVENOUS SOLUTION INTRAVENOUS ONCE
Status: COMPLETED | OUTPATIENT
Start: 2024-11-08 | End: 2024-11-08

## 2024-11-08 RX ORDER — IPRATROPIUM BROMIDE AND ALBUTEROL SULFATE 2.5; .5 MG/3ML; MG/3ML
1 SOLUTION RESPIRATORY (INHALATION) EVERY 4 HOURS PRN
Status: DISCONTINUED | OUTPATIENT
Start: 2024-11-08 | End: 2024-11-16 | Stop reason: HOSPADM

## 2024-11-08 RX ORDER — SODIUM CHLORIDE 9 MG/ML
INJECTION, SOLUTION INTRAVENOUS CONTINUOUS
Status: DISPENSED | OUTPATIENT
Start: 2024-11-08 | End: 2024-11-09

## 2024-11-08 RX ORDER — ACETAMINOPHEN 650 MG/1
650 SUPPOSITORY RECTAL EVERY 6 HOURS PRN
Status: DISCONTINUED | OUTPATIENT
Start: 2024-11-08 | End: 2024-11-16 | Stop reason: HOSPADM

## 2024-11-08 RX ORDER — ONDANSETRON 2 MG/ML
4 INJECTION INTRAMUSCULAR; INTRAVENOUS EVERY 6 HOURS PRN
Status: DISCONTINUED | OUTPATIENT
Start: 2024-11-08 | End: 2024-11-16 | Stop reason: HOSPADM

## 2024-11-08 RX ORDER — POTASSIUM CHLORIDE 1500 MG/1
40 TABLET, EXTENDED RELEASE ORAL PRN
Status: DISCONTINUED | OUTPATIENT
Start: 2024-11-08 | End: 2024-11-16 | Stop reason: HOSPADM

## 2024-11-08 RX ORDER — SODIUM CHLORIDE 9 MG/ML
INJECTION, SOLUTION INTRAVENOUS PRN
Status: DISCONTINUED | OUTPATIENT
Start: 2024-11-08 | End: 2024-11-16 | Stop reason: HOSPADM

## 2024-11-08 RX ORDER — HYDRALAZINE HYDROCHLORIDE 20 MG/ML
5 INJECTION INTRAMUSCULAR; INTRAVENOUS EVERY 6 HOURS PRN
Status: DISCONTINUED | OUTPATIENT
Start: 2024-11-08 | End: 2024-11-13

## 2024-11-08 RX ORDER — LABETALOL HYDROCHLORIDE 5 MG/ML
5 INJECTION, SOLUTION INTRAVENOUS
Status: DISCONTINUED | OUTPATIENT
Start: 2024-11-08 | End: 2024-11-13

## 2024-11-08 RX ORDER — ONDANSETRON 2 MG/ML
4 INJECTION INTRAMUSCULAR; INTRAVENOUS ONCE
Status: COMPLETED | OUTPATIENT
Start: 2024-11-08 | End: 2024-11-08

## 2024-11-08 RX ORDER — ENOXAPARIN SODIUM 100 MG/ML
40 INJECTION SUBCUTANEOUS DAILY
Status: DISCONTINUED | OUTPATIENT
Start: 2024-11-08 | End: 2024-11-16 | Stop reason: HOSPADM

## 2024-11-08 RX ORDER — ACETAMINOPHEN 325 MG/1
650 TABLET ORAL EVERY 6 HOURS PRN
Status: DISCONTINUED | OUTPATIENT
Start: 2024-11-08 | End: 2024-11-16 | Stop reason: HOSPADM

## 2024-11-08 RX ORDER — POLYETHYLENE GLYCOL 3350 17 G/17G
17 POWDER, FOR SOLUTION ORAL DAILY PRN
Status: DISCONTINUED | OUTPATIENT
Start: 2024-11-08 | End: 2024-11-16 | Stop reason: HOSPADM

## 2024-11-08 RX ORDER — MECOBALAMIN 5000 MCG
5 TABLET,DISINTEGRATING ORAL NIGHTLY PRN
Status: DISCONTINUED | OUTPATIENT
Start: 2024-11-08 | End: 2024-11-16 | Stop reason: HOSPADM

## 2024-11-08 RX ORDER — IOPAMIDOL 612 MG/ML
100 INJECTION, SOLUTION INTRAVASCULAR
Status: COMPLETED | OUTPATIENT
Start: 2024-11-08 | End: 2024-11-08

## 2024-11-08 RX ORDER — SODIUM CHLORIDE 0.9 % (FLUSH) 0.9 %
5-40 SYRINGE (ML) INJECTION PRN
Status: DISCONTINUED | OUTPATIENT
Start: 2024-11-08 | End: 2024-11-16 | Stop reason: HOSPADM

## 2024-11-08 RX ADMIN — IOPAMIDOL 100 ML: 612 INJECTION, SOLUTION INTRAVENOUS at 12:57

## 2024-11-08 RX ADMIN — FENTANYL CITRATE 25 MCG: 50 INJECTION, SOLUTION INTRAMUSCULAR; INTRAVENOUS at 12:22

## 2024-11-08 RX ADMIN — ONDANSETRON 4 MG: 4 TABLET, ORALLY DISINTEGRATING ORAL at 22:27

## 2024-11-08 RX ADMIN — INSULIN HUMAN 5 UNITS: 100 INJECTION, SOLUTION PARENTERAL at 13:22

## 2024-11-08 RX ADMIN — ONDANSETRON 4 MG: 2 INJECTION INTRAMUSCULAR; INTRAVENOUS at 11:56

## 2024-11-08 RX ADMIN — HYDRALAZINE HYDROCHLORIDE 5 MG: 20 INJECTION INTRAMUSCULAR; INTRAVENOUS at 21:22

## 2024-11-08 RX ADMIN — SODIUM CHLORIDE 1000 ML: 900 INJECTION, SOLUTION INTRAVENOUS at 11:56

## 2024-11-08 RX ADMIN — SODIUM CHLORIDE: 9 INJECTION, SOLUTION INTRAVENOUS at 23:33

## 2024-11-08 RX ADMIN — SODIUM CHLORIDE 1000 ML: 9 INJECTION, SOLUTION INTRAVENOUS at 17:49

## 2024-11-08 RX ADMIN — SODIUM CHLORIDE: 9 INJECTION, SOLUTION INTRAVENOUS at 19:15

## 2024-11-08 ASSESSMENT — PAIN DESCRIPTION - LOCATION
LOCATION: ABDOMEN
LOCATION: ABDOMEN

## 2024-11-08 ASSESSMENT — PAIN SCALES - GENERAL
PAINLEVEL_OUTOF10: 8
PAINLEVEL_OUTOF10: 0
PAINLEVEL_OUTOF10: 10

## 2024-11-08 ASSESSMENT — PAIN - FUNCTIONAL ASSESSMENT: PAIN_FUNCTIONAL_ASSESSMENT: 0-10

## 2024-11-08 NOTE — H&P
History and Physical    Patient: Henrry Martinez MRN: [id number]  SSN: [id number]    YOB: 1960  Age: 64 y.o.  Sex: male      Subjective:      Henrry Martinez is a 64 y.o. male who presents to [facility] ER with complaint of Vomiting and Diarrhea.  Patient reports that he has been having nausea, vomiting, and diarrhea since Monday (11/04/2024).  Patient reports that he was having >30 BMs/day when he was freely drinking fluids orally.  Patient states that he was treated for a Left Lower Extremity Cellulitis on 10/05/2024 and completed a course of oral antibiotics that were prescribed at that time [Sulfamethoxazole-Trimethoprim (a.k.a. Bactrim, a.k.a. SMX-TMP)].  Patient reports that he started having chills on Tuesday (11/05/2024).  Patient reports that he last had a BM today, but that may be because he stopped his oral intake.  Patient reports hat he does not have a history of Inflammatory Bowel Disease or Colitis.  Patient verifies his Home Medication List.  Patient denies fevers, dysuria, and cough.    In [facility] ER, Patient is noted to have Temperature 97.7°F, Heart Rate 106 bpm, Respiration Rate 22 bpm, Blood Pressure 184/97 mm Hg to 199/100 mm Hg, SpO2 99% on Room Air, WBC 11.0 K/uL, Hgb 17.7 g/dL, Neut% 76%, Neut# 8.4 K/uL, UA (Ketone+, Protein+, UTI-)Na+ 128 mmol/L, K+ 4.5 mmol/L, Cl- 95 mmol/L, Bicarbonate 19 mmol/L, BUN 22 mg/dL, Creatinine 1.04 mg/dL (Baseline Creatinine ~0.76 mg/dL???), eGFR 80, Glucose 395 mg/dL, Albumin 3.4 g/dL, Influenza A/B (-), SARS-CoV-2 (-), and Venous pH 7.23.  CT Abdomen/Pelvis w/ Contrast has been read by Radiologist to show \"1. Severe pancolitis.  -Trace free fluid is likely reactive  2. Hepatosplenomegaly.\"  Patient received IV Fentanyl 25 mcg, IV Regular Insulin 5 units, IV Ondansetron 4 mg and IV NS 1,000 mL in [facility] ER.    Patient is placed on Observation on [facility] Medical Unit for management of Severe Pancolitis with  pectus carinatum; No pectus excavatum  Cardiovascular:  (+) Borderline Tachycardic rate, regular rhythm without rubs, gallops, or murmurs  Respiratory:  Clear to Auscultation Bilaterally without wheezes, rales, or rhonchi; normal effort of breathing on Room Air  Abdominal:  Soft, non-tense, (+) Mild to Moderately Tender LLQ and Epigastrium, worse in LLQ; (+) Questionably Hyperactive BS present without guarding, rebound, or masses  :  Deferred  Extremities:  Pulses 2+ x4 without pitting edema, clubbing, or cyanosis  Musculoskeletal:  Strength 5/5 in BUE and BLE  Integument:  No rash on face, forearms, or legs  Neurological:  Alert & Ostensibly Oriented x4/4; No gross deficits of Visual Acuity, Eye Movement, Jaw Opening, Facial Expression, Hearing, Phonation, or Head Movement; No gross deficits of Tongue Movement or Slurring of Speech  Psychiatric:  Affect is appropriate; Language is present and fluent; Behavior is appropriate      Laboratory Studies:  All labs obtained in [facility] ER in the last 24 hours have been reviewed.       Images Reviewed:  CT ABDOMEN PELVIS W IV CONTRAST Additional Contrast? None  Order: [id number]  Status: Final result       Visible to patient: Yes (not seen)       Next appt: None    0 Result Notes  Details    Reading Physician Reading Date Result Priority   Ana Paula Rose MD  [phone] 11/8/2024      Narrative & Impression  EXAM: CT ABDOMEN AND PELVIS WITH CONTRAST     CLINICAL INDICATION/HISTORY: diffuse abd pain. Vomiting and diarrhea for 5 days.  Nausea. Abdominal pain.     COMPARISON: CT abdomen/pelvis 1/26/2024     TECHNIQUE:  CT abdomen and pelvis with IV contrast.   All CT scans at this facility are performed using dose optimization technique as  appropriate to the performed examination, to include automated exposure control,  adjustment of the mA and/or kV according to patient's size (including  appropriate matching for site-specific examinations), or use of an

## 2024-11-08 NOTE — PROGRESS NOTES
INTERIM UPDATE - 1618 EST on 11/08/2024    [facility] ([facility] ER Clinician calls requesting admission for a 64-year-old male who presents with Vomiting and Diarrhea.    [facility] ER Clinician reports that Patient has Pancolitis with Tachycardia (ostensibly due to Dehydration).    Patient reports that he has been having diarrhea \"every 15 mintues\" and endorses >12 BMs/day.    Plan:  Will admit Patient for Severe Pancolitis with Nausea and Vomiting.

## 2024-11-08 NOTE — ED PROVIDER NOTES
EMERGENCY DEPARTMENT HISTORY AND PHYSICAL EXAM    Date: 11/8/2024  Patient Name: Henrry Martinez    History of Presenting Illness     Chief Complaint   Patient presents with    Diarrhea    Vomiting         History Provided By: Patient       Additional History (Context): Henrry Martinez is a 64 y.o. male with a history of diabetes and hypertension who presents today for abdominal pain, nausea vomiting and diarrhea.  Patient reports that this has been going on for the past 5 days.  States that it did start after eating fried chicken from [employer] however reports he thought it would go away but it has not.  States that he was recently on antibiotics for cellulitis but denies any real foul-smelling stool.  Patient reports he has not tried any thing additional for this at home.  Denies any alcohol tobacco or drug abuse.  Denies any abdominal surgical history.  Reports he is up-to-date on his colonoscopy which was normal.      PCP: No primary care provider on file.    Current Facility-Administered Medications   Medication Dose Route Frequency Provider Last Rate Last Admin    sodium chloride 0.9 % bolus 1,000 mL  1,000 mL IntraVENous Once Jenifer Avina PA        0.9 % sodium chloride infusion   IntraVENous Continuous Jean Claude Reyes E, DO         Current Outpatient Medications   Medication Sig Dispense Refill    ZOLOFT 100 MG tablet Take 2 tablets by mouth      tiZANidine (ZANAFLEX) 4 MG tablet       metFORMIN (GLUCOPHAGE) 1000 MG tablet Take 1 tablet by mouth 2 times daily (with meals)      Gabapentin, Once-Daily, (GRALISE) 900 MG TABS Take 1,800 mg by mouth daily      lisinopril (PRINIVIL;ZESTRIL) 5 MG tablet Take 1 tablet by mouth daily         Past History     Past Medical History:  Past Medical History:   Diagnosis Date    Diabetes (HCC)     Dystonia     Dystonia     Hypertension        Past Surgical History:  No past surgical history on file.    Family History:  No family history on file.    Social

## 2024-11-09 PROBLEM — R16.2 HEPATOSPLENOMEGALY: Status: RESOLVED | Noted: 2024-11-08 | Resolved: 2024-11-09

## 2024-11-09 PROBLEM — E87.1 HYPONATREMIA: Status: ACTIVE | Noted: 2024-11-09

## 2024-11-09 PROBLEM — A41.9 SEPSIS WITHOUT ACUTE ORGAN DYSFUNCTION (HCC): Status: ACTIVE | Noted: 2024-11-09

## 2024-11-09 PROBLEM — I10 HYPERTENSION: Chronic | Status: RESOLVED | Noted: 2024-11-08 | Resolved: 2024-11-09

## 2024-11-09 LAB
ALBUMIN SERPL-MCNC: 2.7 G/DL (ref 3.4–5)
ALBUMIN/GLOB SERPL: 0.9 (ref 0.8–1.7)
ALP SERPL-CCNC: 65 U/L (ref 45–117)
ALT SERPL-CCNC: 19 U/L (ref 16–61)
ANION GAP SERPL CALC-SCNC: 13 MMOL/L (ref 3–18)
AST SERPL-CCNC: 10 U/L (ref 10–38)
BASOPHILS # BLD: 0.1 K/UL (ref 0–0.1)
BASOPHILS NFR BLD: 1 % (ref 0–2)
BILIRUB SERPL-MCNC: 0.8 MG/DL (ref 0.2–1)
BUN SERPL-MCNC: 19 MG/DL (ref 7–18)
BUN/CREAT SERPL: 24 (ref 12–20)
CALCIUM SERPL-MCNC: 8.4 MG/DL (ref 8.5–10.1)
CHLORIDE SERPL-SCNC: 105 MMOL/L (ref 100–111)
CO2 SERPL-SCNC: 17 MMOL/L (ref 21–32)
CREAT SERPL-MCNC: 0.8 MG/DL (ref 0.6–1.3)
CRP SERPL-MCNC: 0.6 MG/DL (ref 0–0.3)
DIFFERENTIAL METHOD BLD: ABNORMAL
EOSINOPHIL # BLD: 0.1 K/UL (ref 0–0.4)
EOSINOPHIL NFR BLD: 1 % (ref 0–5)
ERYTHROCYTE [DISTWIDTH] IN BLOOD BY AUTOMATED COUNT: 13.1 % (ref 11.6–14.5)
ERYTHROCYTE [SEDIMENTATION RATE] IN BLOOD: 11 MM/HR (ref 0–20)
GLOBULIN SER CALC-MCNC: 3.1 G/DL (ref 2–4)
GLUCOSE BLD STRIP.AUTO-MCNC: 254 MG/DL (ref 70–110)
GLUCOSE BLD STRIP.AUTO-MCNC: 267 MG/DL (ref 70–110)
GLUCOSE BLD STRIP.AUTO-MCNC: 284 MG/DL (ref 70–110)
GLUCOSE BLD STRIP.AUTO-MCNC: 287 MG/DL (ref 70–110)
GLUCOSE BLD STRIP.AUTO-MCNC: 331 MG/DL (ref 70–110)
GLUCOSE SERPL-MCNC: 278 MG/DL (ref 74–99)
HCT VFR BLD AUTO: 45.9 % (ref 36–48)
HEMOCCULT STL QL: NEGATIVE
HGB BLD-MCNC: 15.8 G/DL (ref 13–16)
IMM GRANULOCYTES # BLD AUTO: 0.2 K/UL (ref 0–0.04)
IMM GRANULOCYTES NFR BLD AUTO: 2 % (ref 0–0.5)
LYMPHOCYTES # BLD: 1.9 K/UL (ref 0.9–3.6)
LYMPHOCYTES NFR BLD: 21 % (ref 21–52)
MCH RBC QN AUTO: 29.9 PG (ref 24–34)
MCHC RBC AUTO-ENTMCNC: 34.4 G/DL (ref 31–37)
MCV RBC AUTO: 86.9 FL (ref 78–100)
MONOCYTES # BLD: 0.7 K/UL (ref 0.05–1.2)
MONOCYTES NFR BLD: 8 % (ref 3–10)
NEUTS SEG # BLD: 6.3 K/UL (ref 1.8–8)
NEUTS SEG NFR BLD: 69 % (ref 40–73)
NRBC # BLD: 0 K/UL (ref 0–0.01)
NRBC BLD-RTO: 0 PER 100 WBC
PLATELET # BLD AUTO: 200 K/UL (ref 135–420)
PMV BLD AUTO: 9.7 FL (ref 9.2–11.8)
POTASSIUM SERPL-SCNC: 3.7 MMOL/L (ref 3.5–5.5)
PROCALCITONIN SERPL-MCNC: 0.15 NG/ML
PROT SERPL-MCNC: 5.8 G/DL (ref 6.4–8.2)
RBC # BLD AUTO: 5.28 M/UL (ref 4.35–5.65)
SODIUM SERPL-SCNC: 135 MMOL/L (ref 136–145)
WBC # BLD AUTO: 9.1 K/UL (ref 4.6–13.2)

## 2024-11-09 PROCEDURE — 6370000000 HC RX 637 (ALT 250 FOR IP): Performed by: INTERNAL MEDICINE

## 2024-11-09 PROCEDURE — G0378 HOSPITAL OBSERVATION PER HR: HCPCS

## 2024-11-09 PROCEDURE — 96372 THER/PROPH/DIAG INJ SC/IM: CPT

## 2024-11-09 PROCEDURE — 82962 GLUCOSE BLOOD TEST: CPT

## 2024-11-09 PROCEDURE — 85652 RBC SED RATE AUTOMATED: CPT

## 2024-11-09 PROCEDURE — 99232 SBSQ HOSP IP/OBS MODERATE 35: CPT | Performed by: STUDENT IN AN ORGANIZED HEALTH CARE EDUCATION/TRAINING PROGRAM

## 2024-11-09 PROCEDURE — 2580000003 HC RX 258: Performed by: STUDENT IN AN ORGANIZED HEALTH CARE EDUCATION/TRAINING PROGRAM

## 2024-11-09 PROCEDURE — 96361 HYDRATE IV INFUSION ADD-ON: CPT

## 2024-11-09 PROCEDURE — 86140 C-REACTIVE PROTEIN: CPT

## 2024-11-09 PROCEDURE — 6370000000 HC RX 637 (ALT 250 FOR IP): Performed by: STUDENT IN AN ORGANIZED HEALTH CARE EDUCATION/TRAINING PROGRAM

## 2024-11-09 PROCEDURE — 80053 COMPREHEN METABOLIC PANEL: CPT

## 2024-11-09 PROCEDURE — 97161 PT EVAL LOW COMPLEX 20 MIN: CPT

## 2024-11-09 PROCEDURE — 84145 PROCALCITONIN (PCT): CPT

## 2024-11-09 PROCEDURE — 2580000003 HC RX 258: Performed by: INTERNAL MEDICINE

## 2024-11-09 PROCEDURE — 36415 COLL VENOUS BLD VENIPUNCTURE: CPT

## 2024-11-09 PROCEDURE — 6360000002 HC RX W HCPCS: Performed by: INTERNAL MEDICINE

## 2024-11-09 PROCEDURE — 96376 TX/PRO/DX INJ SAME DRUG ADON: CPT

## 2024-11-09 PROCEDURE — 85025 COMPLETE CBC W/AUTO DIFF WBC: CPT

## 2024-11-09 RX ORDER — INSULIN LISPRO 100 [IU]/ML
0-8 INJECTION, SOLUTION INTRAVENOUS; SUBCUTANEOUS
Status: DISCONTINUED | OUTPATIENT
Start: 2024-11-09 | End: 2024-11-16 | Stop reason: HOSPADM

## 2024-11-09 RX ORDER — LISINOPRIL 5 MG/1
5 TABLET ORAL DAILY
Status: DISCONTINUED | OUTPATIENT
Start: 2024-11-09 | End: 2024-11-16 | Stop reason: HOSPADM

## 2024-11-09 RX ORDER — INSULIN LISPRO 100 [IU]/ML
3 INJECTION, SOLUTION INTRAVENOUS; SUBCUTANEOUS
Status: DISCONTINUED | OUTPATIENT
Start: 2024-11-09 | End: 2024-11-10

## 2024-11-09 RX ORDER — GLUCAGON 1 MG
1 KIT INJECTION PRN
Status: DISCONTINUED | OUTPATIENT
Start: 2024-11-09 | End: 2024-11-16 | Stop reason: HOSPADM

## 2024-11-09 RX ORDER — SODIUM CHLORIDE, SODIUM LACTATE, POTASSIUM CHLORIDE, CALCIUM CHLORIDE 600; 310; 30; 20 MG/100ML; MG/100ML; MG/100ML; MG/100ML
INJECTION, SOLUTION INTRAVENOUS CONTINUOUS
Status: DISCONTINUED | OUTPATIENT
Start: 2024-11-09 | End: 2024-11-13

## 2024-11-09 RX ORDER — DEXTROSE MONOHYDRATE 100 MG/ML
INJECTION, SOLUTION INTRAVENOUS CONTINUOUS PRN
Status: DISCONTINUED | OUTPATIENT
Start: 2024-11-09 | End: 2024-11-16 | Stop reason: HOSPADM

## 2024-11-09 RX ORDER — INSULIN GLARGINE 100 [IU]/ML
12 INJECTION, SOLUTION SUBCUTANEOUS NIGHTLY
Status: DISCONTINUED | OUTPATIENT
Start: 2024-11-09 | End: 2024-11-10

## 2024-11-09 RX ADMIN — ONDANSETRON 4 MG: 2 INJECTION, SOLUTION INTRAMUSCULAR; INTRAVENOUS at 16:33

## 2024-11-09 RX ADMIN — INSULIN LISPRO 6 UNITS: 100 INJECTION, SOLUTION INTRAVENOUS; SUBCUTANEOUS at 20:43

## 2024-11-09 RX ADMIN — INSULIN GLARGINE 12 UNITS: 100 INJECTION, SOLUTION SUBCUTANEOUS at 20:43

## 2024-11-09 RX ADMIN — ENOXAPARIN SODIUM 40 MG: 100 INJECTION SUBCUTANEOUS at 16:30

## 2024-11-09 RX ADMIN — INSULIN LISPRO 4 UNITS: 100 INJECTION, SOLUTION INTRAVENOUS; SUBCUTANEOUS at 13:05

## 2024-11-09 RX ADMIN — LISINOPRIL 5 MG: 5 TABLET ORAL at 10:07

## 2024-11-09 RX ADMIN — SERTRALINE HYDROCHLORIDE 200 MG: 50 TABLET ORAL at 10:07

## 2024-11-09 RX ADMIN — INSULIN LISPRO 3 UNITS: 100 INJECTION, SOLUTION INTRAVENOUS; SUBCUTANEOUS at 16:29

## 2024-11-09 RX ADMIN — SODIUM CHLORIDE, PRESERVATIVE FREE 10 ML: 5 INJECTION INTRAVENOUS at 20:43

## 2024-11-09 RX ADMIN — ACETAMINOPHEN 325MG 650 MG: 325 TABLET ORAL at 16:33

## 2024-11-09 RX ADMIN — INSULIN LISPRO 4 UNITS: 100 INJECTION, SOLUTION INTRAVENOUS; SUBCUTANEOUS at 16:29

## 2024-11-09 RX ADMIN — SODIUM CHLORIDE, PRESERVATIVE FREE 10 ML: 5 INJECTION INTRAVENOUS at 10:11

## 2024-11-09 RX ADMIN — TIZANIDINE 4 MG: 4 TABLET ORAL at 16:33

## 2024-11-09 RX ADMIN — INSULIN LISPRO 4 UNITS: 100 INJECTION, SOLUTION INTRAVENOUS; SUBCUTANEOUS at 10:07

## 2024-11-09 RX ADMIN — SODIUM CHLORIDE, PRESERVATIVE FREE 10 ML: 5 INJECTION INTRAVENOUS at 06:03

## 2024-11-09 RX ADMIN — SODIUM CHLORIDE, POTASSIUM CHLORIDE, SODIUM LACTATE AND CALCIUM CHLORIDE: 600; 310; 30; 20 INJECTION, SOLUTION INTRAVENOUS at 20:42

## 2024-11-09 ASSESSMENT — PAIN SCALES - GENERAL
PAINLEVEL_OUTOF10: 7
PAINLEVEL_OUTOF10: 0
PAINLEVEL_OUTOF10: 0
PAINLEVEL_OUTOF10: 7
PAINLEVEL_OUTOF10: 0

## 2024-11-09 ASSESSMENT — PAIN DESCRIPTION - DESCRIPTORS: DESCRIPTORS: ACHING;DISCOMFORT

## 2024-11-09 ASSESSMENT — PAIN - FUNCTIONAL ASSESSMENT: PAIN_FUNCTIONAL_ASSESSMENT: PREVENTS OR INTERFERES SOME ACTIVE ACTIVITIES AND ADLS

## 2024-11-09 ASSESSMENT — PAIN DESCRIPTION - LOCATION: LOCATION: ABDOMEN

## 2024-11-09 ASSESSMENT — PAIN DESCRIPTION - ORIENTATION: ORIENTATION: RIGHT;LEFT

## 2024-11-09 NOTE — PROGRESS NOTES
4 Eyes Skin Assessment     NAME:  Henrry Martinez  YOB: 1960  MEDICAL RECORD NUMBER:  [id number]    The patient is being assessed for  Shift Handoff    I agree that at least one RN has performed a thorough Head to Toe Skin Assessment on the patient. ALL assessment sites listed below have been assessed.      Areas assessed by both nurses:    Head, Face, Ears, Shoulders, Back, Chest, Arms, Elbows, Hands, Sacrum. Buttock, Coccyx, Ischium, Legs. Feet and Heels, and Under Medical Devices         Does the Patient have a Wound? No noted wound(s)       Prasanna Prevention initiated by RN: No  Wound Care Orders initiated by RN: No    Pressure Injury (Stage 3,4, Unstageable, DTI, NWPT, and Complex wounds) if present, place Wound referral order by RN under : No    New Ostomies, if present place, Ostomy referral order under : No     Nurse 1 eSignature: Electronically signed by Hawa Fragoso RN on 11/9/24 at 6:56 PM EST    **SHARE this note so that the co-signing nurse can place an eSignature**    Nurse 2 eSignature: Electronically signed by Ivon Ibarra RN on 11/9/24 at 7:16 PM EST

## 2024-11-09 NOTE — PROGRESS NOTES
Eosinophils % 1 0 - 5 %    Basophils % 1 0 - 2 %    Immature Granulocytes % 2 (H) 0.0 - 0.5 %    Neutrophils Absolute 6.3 1.8 - 8.0 K/UL    Lymphocytes Absolute 1.9 0.9 - 3.6 K/UL    Monocytes Absolute 0.7 0.05 - 1.2 K/UL    Eosinophils Absolute 0.1 0.0 - 0.4 K/UL    Basophils Absolute 0.1 0.0 - 0.1 K/UL    Immature Granulocytes Absolute 0.2 (H) 0.00 - 0.04 K/UL    Differential Type AUTOMATED     POCT Glucose    Collection Time: 11/09/24  3:34 AM   Result Value Ref Range    POC Glucose 254 (H) 70 - 110 mg/dL   POCT Glucose    Collection Time: 11/09/24  6:58 AM   Result Value Ref Range    POC Glucose 267 (H) 70 - 110 mg/dL   POCT Glucose    Collection Time: 11/09/24 11:26 AM   Result Value Ref Range    POC Glucose 287 (H) 70 - 110 mg/dL   POCT Glucose    Collection Time: 11/09/24  3:07 PM   Result Value Ref Range    POC Glucose 284 (H) 70 - 110 mg/dL       Imaging:  CT ABDOMEN PELVIS W IV CONTRAST Additional Contrast? None    Result Date: 11/8/2024  EXAM: CT ABDOMEN AND PELVIS WITH CONTRAST CLINICAL INDICATION/HISTORY: diffuse abd pain. Vomiting and diarrhea for 5 days. Nausea. Abdominal pain. COMPARISON: CT abdomen/pelvis 1/26/2024 TECHNIQUE:  CT abdomen and pelvis with IV contrast. All CT scans at this facility are performed using dose optimization technique as appropriate to the performed examination, to include automated exposure control, adjustment of the mA and/or kV according to patient's size (including appropriate matching for site-specific examinations), or use of an iterative reconstruction technique. FINDINGS: Lower chest: Unremarkable. Liver: Mildly enlarged, measuring 19 cm in craniocaudal dimension. Biliary: Unremarkable. Pancreas: Unremarkable. Spleen: Enlarged, measuring up to 14 cm. Adrenal glands: Unremarkable. Kidneys: Unremarkable. Bladder: Unremarkable. Reproductive organs: Unremarkable. Bowel: Diffuse colon wall thickening, with surrounding stranding. Multiple colonic diverticula, but  4:29 PM

## 2024-11-09 NOTE — PLAN OF CARE
Problem: Chronic Conditions and Co-morbidities  Goal: Patient's chronic conditions and co-morbidity symptoms are monitored and maintained or improved  Outcome: Progressing  Flowsheets (Taken 11/9/2024 0839)  Care Plan - Patient's Chronic Conditions and Co-Morbidity Symptoms are Monitored and Maintained or Improved:   Collaborate with multidisciplinary team to address chronic and comorbid conditions and prevent exacerbation or deterioration   Monitor and assess patient's chronic conditions and comorbid symptoms for stability, deterioration, or improvement   Update acute care plan with appropriate goals if chronic or comorbid symptoms are exacerbated and prevent overall improvement and discharge     Problem: Discharge Planning  Goal: Discharge to home or other facility with appropriate resources  Outcome: Progressing  Flowsheets (Taken 11/9/2024 0839)  Discharge to home or other facility with appropriate resources: Identify barriers to discharge with patient and caregiver  Note: Patient heading towards discharge no bowel movement my shift.     Problem: Pain  Goal: Verbalizes/displays adequate comfort level or baseline comfort level  Outcome: Progressing  Flowsheets (Taken 11/9/2024 0839)  Verbalizes/displays adequate comfort level or baseline comfort level: Encourage patient to monitor pain and request assistance  Note: Patient has not stated any oain this shift

## 2024-11-09 NOTE — CONSULTS
diabetes mellitus, without long-term current use of insulin (HCC)    Dystonia    Depression    Hepatosplenomegaly       Home Medications:     Prior to Admission medications    Medication Sig Start Date End Date Taking? Authorizing Provider   ZOLOFT 100 MG tablet Take 2 tablets by mouth   Yes Bk Jefferson MD   tiZANidine (ZANAFLEX) 4 MG tablet  1/20/24  Yes Bk Jefferson MD   Gabapentin, Once-Daily, (GRALISE) 900 MG TABS Take 1,800 mg by mouth daily   Yes Bk Jefferson MD   lisinopril (PRINIVIL;ZESTRIL) 5 MG tablet Take 1 tablet by mouth daily   Yes Bk Jefferson MD   metFORMIN (GLUCOPHAGE) 1000 MG tablet Take 1 tablet by mouth 2 times daily (with meals)    ProviderBk MD       Review of Systems:     Constitutional: No fevers, chills, weight loss, fatigue., gen malaise    Skin: No rashes, pruritis, jaundice, ulcerations, erythema.   HENT: No headaches, nosebleeds, sinus pressure, rhinorrhea, sore throat.   Eyes: No visual changes, blurred vision, eye pain, photophobia, jaundice.   Cardiovascular: No chest pain, heart palpitations.   Respiratory: No cough, SOB, wheezing, chest discomfort, orthopnea.   Gastrointestinal: Neg unless noted otherwise in H&P   Genitourinary: No dysuria, bleeding, discharge, pyuria.   Musculoskeletal: No weakness, arthralgias, wasting.   Endo: No sweats.   Heme: No bruising, easy bleeding.   Allergies: As noted.   Neurological: Cranial nerves intact.  Alert and oriented. Gait not assessed.   Psychiatric:  No anxiety, depression, hallucinations.          BP (!) 152/80   Pulse 99   Temp 96.9 °F (36.1 °C) (Oral)   Resp 18   Ht 1.854 m (6' 1\")   Wt 95.3 kg (210 lb)   SpO2 96%   BMI 27.71 kg/m²     Physical Assessment:     constitutional: appearance: well developed, well nourished, obese habitus, no deformities, in some discomfort now   skin: inspection: no rashes, ulcers, icterus or other lesions; no clubbing or telangiectasias. palpation: no  \"GPT\", \"SGOT\", \"AP\", \"AML\", \"LPSE\"     Coags   No results for input(s): \"INR\", \"APTT\" in the last 72 hours.    Invalid input(s): \"PTP\"        LEI WOODARD MD.   [facility]  [phone]

## 2024-11-09 NOTE — PLAN OF CARE
Problem: Physical Therapy - Adult  Goal: By Discharge: Performs mobility at highest level of function for planned discharge setting.  See evaluation for individualized goals.  Description: Physical Therapy Goals:  Initiated 11/9/2024 to be met within 7-10 days.    1.  Patient will move from supine to sit and sit to supine  in bed with independence.    2.  Patient will transfer from bed to chair and chair to bed with independence using the least restrictive device.  3.  Patient will perform sit to stand with independence.  4.  Patient will ambulate with independence for 250 feet with the least restrictive device.     PLOF: Independent.      Outcome: Progressing     PHYSICAL THERAPY EVALUATION    Patient: Henrry Martinez (64 y.o. male)  Date: 11/9/2024  Primary Diagnosis: Dehydration [E86.0]  Colitis [K52.9]  Pancolitis (HCC) [K51.00]  Diarrhea, unspecified type [R19.7]  Hyperglycemia due to diabetes mellitus (HCC) [E11.65]       Precautions: Contact Precautions, Modified Diet, Fall Risk,  ,  ,  ,  ,  ,  ,      ASSESSMENT :  Pt presents with impaired functional mobility, decreased activity tolerance, and generalized weakness 2/2 admission. Pt sitting at EOB upon arrival, reports he has been ambulating to the bathroom, reports his abdomen is hurting and he has not had a BM today. Set up a chair for patient and educated on benefits of positional change and mobility to decrease risk of deconditioning as well as to maintain good GI motility. Pt ambulated in room with slow pace, no LOB noted yet pt reported feeling weaker than normal. Pt sitting EOB at end of session, RN updated.    DEFICITS/IMPAIRMENTS:    , Body Structures, Functions, Activity Limitations Requiring Skilled Therapeutic Intervention: Decreased functional mobility ;Decreased endurance;Decreased strength    Patient will benefit from skilled intervention to address the above impairments.  Patient's rehabilitation potential/Therapy Prognosis:

## 2024-11-09 NOTE — PROGRESS NOTES
4 Eyes Skin Assessment     NAME:  Henrry Martinez  YOB: 1960  MEDICAL RECORD NUMBER:  [id number]    The patient is being assessed for  Shift Handoff    I agree that at least one RN has performed a thorough Head to Toe Skin Assessment on the patient. ALL assessment sites listed below have been assessed.      Areas assessed by both nurses:    Head, Face, Ears, Shoulders, Back, Chest, Arms, Elbows, Hands, Sacrum. Buttock, Coccyx, Ischium, Legs. Feet and Heels, and Under Medical Devices         Does the Patient have a Wound? No noted wound(s)       Prasanna Prevention initiated by RN: Yes  Wound Care Orders initiated by RN: No    Pressure Injury (Stage 3,4, Unstageable, DTI, NWPT, and Complex wounds) if present, place Wound referral order by RN under : No    New Ostomies, if present place, Ostomy referral order under : No     Nurse 1 eSignature: Electronically signed by Martin Coulter RN on 11/8/24 at 8:04 PM EST    **SHARE this note so that the co-signing nurse can place an eSignature**    Nurse 2 eSignature: Electronically signed by Hawa Fragoso RN on 11/9/24 at 6:55 PM EST

## 2024-11-09 NOTE — PROGRESS NOTES
Assumed care of patient from SUNITHA Vergara (off going nurse). Patient alert and oriented. No apparent distress noted. Patient offers no concerns and can verbalize needs. Assessment to follow. Call bell within reach. Bed in lowest, locked position.

## 2024-11-09 NOTE — PLAN OF CARE
Problem: Chronic Conditions and Co-morbidities  Goal: Patient's chronic conditions and co-morbidity symptoms are monitored and maintained or improved  11/9/2024 1312 by Hawa Fragoso RN  Outcome: Progressing  11/9/2024 0839 by Jai Luna RN  Outcome: Progressing  Flowsheets (Taken 11/9/2024 0839)  Care Plan - Patient's Chronic Conditions and Co-Morbidity Symptoms are Monitored and Maintained or Improved:   Collaborate with multidisciplinary team to address chronic and comorbid conditions and prevent exacerbation or deterioration   Monitor and assess patient's chronic conditions and comorbid symptoms for stability, deterioration, or improvement   Update acute care plan with appropriate goals if chronic or comorbid symptoms are exacerbated and prevent overall improvement and discharge     Problem: Discharge Planning  Goal: Discharge to home or other facility with appropriate resources  11/9/2024 1312 by Hawa Fragoso RN  Outcome: Progressing  11/9/2024 0839 by Jai Luna RN  Outcome: Progressing  Flowsheets (Taken 11/9/2024 0839)  Discharge to home or other facility with appropriate resources: Identify barriers to discharge with patient and caregiver  Note: Patient heading towards discharge no bowel movement my shift.     Problem: Pain  Goal: Verbalizes/displays adequate comfort level or baseline comfort level  11/9/2024 1312 by Hawa Fragoso RN  Outcome: Progressing  11/9/2024 0839 by Jai Luna RN  Outcome: Progressing  Flowsheets (Taken 11/9/2024 0839)  Verbalizes/displays adequate comfort level or baseline comfort level: Encourage patient to monitor pain and request assistance  Note: Patient has not stated any oain this shift      Problem: Physical Therapy - Adult  Goal: By Discharge: Performs mobility at highest level of function for planned discharge setting.  See evaluation for individualized goals.  Description: Physical Therapy Goals:  Initiated 11/9/2024 to be met within 7-10 days.    1.

## 2024-11-10 PROBLEM — A41.9 SEPSIS (HCC): Status: ACTIVE | Noted: 2024-11-10

## 2024-11-10 LAB
ALBUMIN SERPL-MCNC: 2.5 G/DL (ref 3.4–5)
ALBUMIN/GLOB SERPL: 0.9 (ref 0.8–1.7)
ALP SERPL-CCNC: 57 U/L (ref 45–117)
ALT SERPL-CCNC: 14 U/L (ref 16–61)
ANION GAP SERPL CALC-SCNC: 10 MMOL/L (ref 3–18)
AST SERPL-CCNC: 10 U/L (ref 10–38)
BASOPHILS # BLD: 0.1 K/UL (ref 0–0.1)
BASOPHILS NFR BLD: 1 % (ref 0–2)
BILIRUB SERPL-MCNC: 0.9 MG/DL (ref 0.2–1)
BUN SERPL-MCNC: 18 MG/DL (ref 7–18)
BUN/CREAT SERPL: 23 (ref 12–20)
C COLI+JEJUNI TUF STL QL NAA+PROBE: NEGATIVE
CALCIUM SERPL-MCNC: 8.6 MG/DL (ref 8.5–10.1)
CHLORIDE SERPL-SCNC: 104 MMOL/L (ref 100–111)
CO2 SERPL-SCNC: 20 MMOL/L (ref 21–32)
CREAT SERPL-MCNC: 0.79 MG/DL (ref 0.6–1.3)
DIFFERENTIAL METHOD BLD: ABNORMAL
EC STX1+STX2 GENES STL QL NAA+PROBE: NEGATIVE
EOSINOPHIL # BLD: 0.1 K/UL (ref 0–0.4)
EOSINOPHIL NFR BLD: 1 % (ref 0–5)
ERYTHROCYTE [DISTWIDTH] IN BLOOD BY AUTOMATED COUNT: 13.1 % (ref 11.6–14.5)
ETEC ELTA+ESTB GENES STL QL NAA+PROBE: NEGATIVE
GLOBULIN SER CALC-MCNC: 2.8 G/DL (ref 2–4)
GLUCOSE BLD STRIP.AUTO-MCNC: 204 MG/DL (ref 70–110)
GLUCOSE BLD STRIP.AUTO-MCNC: 260 MG/DL (ref 70–110)
GLUCOSE BLD STRIP.AUTO-MCNC: 262 MG/DL (ref 70–110)
GLUCOSE BLD STRIP.AUTO-MCNC: 264 MG/DL (ref 70–110)
GLUCOSE SERPL-MCNC: 284 MG/DL (ref 74–99)
HCT VFR BLD AUTO: 46.1 % (ref 36–48)
HGB BLD-MCNC: 16 G/DL (ref 13–16)
IMM GRANULOCYTES # BLD AUTO: 0.2 K/UL (ref 0–0.04)
IMM GRANULOCYTES NFR BLD AUTO: 2 % (ref 0–0.5)
LYMPHOCYTES # BLD: 2.2 K/UL (ref 0.9–3.6)
LYMPHOCYTES NFR BLD: 24 % (ref 21–52)
MCH RBC QN AUTO: 30.3 PG (ref 24–34)
MCHC RBC AUTO-ENTMCNC: 34.7 G/DL (ref 31–37)
MCV RBC AUTO: 87.3 FL (ref 78–100)
MONOCYTES # BLD: 0.9 K/UL (ref 0.05–1.2)
MONOCYTES NFR BLD: 10 % (ref 3–10)
NEUTS SEG # BLD: 5.8 K/UL (ref 1.8–8)
NEUTS SEG NFR BLD: 63 % (ref 40–73)
NRBC # BLD: 0 K/UL (ref 0–0.01)
NRBC BLD-RTO: 0 PER 100 WBC
P SHIGELLOIDES DNA STL QL NAA+PROBE: NEGATIVE
PLATELET # BLD AUTO: 183 K/UL (ref 135–420)
PMV BLD AUTO: 9.5 FL (ref 9.2–11.8)
POTASSIUM SERPL-SCNC: 3.3 MMOL/L (ref 3.5–5.5)
PROT SERPL-MCNC: 5.3 G/DL (ref 6.4–8.2)
RBC # BLD AUTO: 5.28 M/UL (ref 4.35–5.65)
SALMONELLA SP SPAO STL QL NAA+PROBE: NEGATIVE
SHIGELLA SP+EIEC IPAH STL QL NAA+PROBE: NEGATIVE
SODIUM SERPL-SCNC: 134 MMOL/L (ref 136–145)
V CHOL+PARA+VUL DNA STL QL NAA+NON-PROBE: NEGATIVE
WBC # BLD AUTO: 9.2 K/UL (ref 4.6–13.2)
Y ENTEROCOL DNA STL QL NAA+NON-PROBE: NEGATIVE

## 2024-11-10 PROCEDURE — 6370000000 HC RX 637 (ALT 250 FOR IP): Performed by: INTERNAL MEDICINE

## 2024-11-10 PROCEDURE — 80053 COMPREHEN METABOLIC PANEL: CPT

## 2024-11-10 PROCEDURE — 87177 OVA AND PARASITES SMEARS: CPT

## 2024-11-10 PROCEDURE — 85025 COMPLETE CBC W/AUTO DIFF WBC: CPT

## 2024-11-10 PROCEDURE — G0378 HOSPITAL OBSERVATION PER HR: HCPCS

## 2024-11-10 PROCEDURE — 1100000000 HC RM PRIVATE

## 2024-11-10 PROCEDURE — 96376 TX/PRO/DX INJ SAME DRUG ADON: CPT

## 2024-11-10 PROCEDURE — 87449 NOS EACH ORGANISM AG IA: CPT

## 2024-11-10 PROCEDURE — 2580000003 HC RX 258: Performed by: STUDENT IN AN ORGANIZED HEALTH CARE EDUCATION/TRAINING PROGRAM

## 2024-11-10 PROCEDURE — 87324 CLOSTRIDIUM AG IA: CPT

## 2024-11-10 PROCEDURE — 36415 COLL VENOUS BLD VENIPUNCTURE: CPT

## 2024-11-10 PROCEDURE — 94761 N-INVAS EAR/PLS OXIMETRY MLT: CPT

## 2024-11-10 PROCEDURE — 97535 SELF CARE MNGMENT TRAINING: CPT

## 2024-11-10 PROCEDURE — 82962 GLUCOSE BLOOD TEST: CPT

## 2024-11-10 PROCEDURE — 87209 SMEAR COMPLEX STAIN: CPT

## 2024-11-10 PROCEDURE — 99232 SBSQ HOSP IP/OBS MODERATE 35: CPT | Performed by: STUDENT IN AN ORGANIZED HEALTH CARE EDUCATION/TRAINING PROGRAM

## 2024-11-10 PROCEDURE — 97165 OT EVAL LOW COMPLEX 30 MIN: CPT

## 2024-11-10 PROCEDURE — 2580000003 HC RX 258: Performed by: INTERNAL MEDICINE

## 2024-11-10 PROCEDURE — 6360000002 HC RX W HCPCS: Performed by: INTERNAL MEDICINE

## 2024-11-10 PROCEDURE — 6370000000 HC RX 637 (ALT 250 FOR IP): Performed by: STUDENT IN AN ORGANIZED HEALTH CARE EDUCATION/TRAINING PROGRAM

## 2024-11-10 RX ORDER — INSULIN GLARGINE 100 [IU]/ML
15 INJECTION, SOLUTION SUBCUTANEOUS NIGHTLY
Status: DISCONTINUED | OUTPATIENT
Start: 2024-11-10 | End: 2024-11-11

## 2024-11-10 RX ORDER — INSULIN LISPRO 100 [IU]/ML
5 INJECTION, SOLUTION INTRAVENOUS; SUBCUTANEOUS
Status: DISCONTINUED | OUTPATIENT
Start: 2024-11-11 | End: 2024-11-11

## 2024-11-10 RX ADMIN — Medication 5 MG: at 01:58

## 2024-11-10 RX ADMIN — INSULIN LISPRO 7 UNITS: 100 INJECTION, SOLUTION INTRAVENOUS; SUBCUTANEOUS at 16:56

## 2024-11-10 RX ADMIN — INSULIN LISPRO 3 UNITS: 100 INJECTION, SOLUTION INTRAVENOUS; SUBCUTANEOUS at 08:50

## 2024-11-10 RX ADMIN — INSULIN LISPRO 2 UNITS: 100 INJECTION, SOLUTION INTRAVENOUS; SUBCUTANEOUS at 21:34

## 2024-11-10 RX ADMIN — SERTRALINE HYDROCHLORIDE 200 MG: 50 TABLET ORAL at 08:49

## 2024-11-10 RX ADMIN — INSULIN GLARGINE 15 UNITS: 100 INJECTION, SOLUTION SUBCUTANEOUS at 20:51

## 2024-11-10 RX ADMIN — TIZANIDINE 4 MG: 4 TABLET ORAL at 01:58

## 2024-11-10 RX ADMIN — FIDAXOMICIN 200 MG: 200 TABLET, FILM COATED ORAL at 12:37

## 2024-11-10 RX ADMIN — INSULIN LISPRO 4 UNITS: 100 INJECTION, SOLUTION INTRAVENOUS; SUBCUTANEOUS at 12:27

## 2024-11-10 RX ADMIN — INSULIN LISPRO 3 UNITS: 100 INJECTION, SOLUTION INTRAVENOUS; SUBCUTANEOUS at 16:56

## 2024-11-10 RX ADMIN — ACETAMINOPHEN 325MG 650 MG: 325 TABLET ORAL at 12:32

## 2024-11-10 RX ADMIN — ONDANSETRON 4 MG: 2 INJECTION, SOLUTION INTRAMUSCULAR; INTRAVENOUS at 08:50

## 2024-11-10 RX ADMIN — SODIUM CHLORIDE, PRESERVATIVE FREE 10 ML: 5 INJECTION INTRAVENOUS at 08:51

## 2024-11-10 RX ADMIN — ENOXAPARIN SODIUM 40 MG: 100 INJECTION SUBCUTANEOUS at 16:56

## 2024-11-10 RX ADMIN — INSULIN LISPRO 3 UNITS: 100 INJECTION, SOLUTION INTRAVENOUS; SUBCUTANEOUS at 12:27

## 2024-11-10 RX ADMIN — ONDANSETRON 4 MG: 2 INJECTION, SOLUTION INTRAMUSCULAR; INTRAVENOUS at 19:01

## 2024-11-10 RX ADMIN — ONDANSETRON 4 MG: 2 INJECTION, SOLUTION INTRAMUSCULAR; INTRAVENOUS at 01:53

## 2024-11-10 RX ADMIN — INSULIN LISPRO 4 UNITS: 100 INJECTION, SOLUTION INTRAVENOUS; SUBCUTANEOUS at 08:51

## 2024-11-10 RX ADMIN — POTASSIUM CHLORIDE 40 MEQ: 1500 TABLET, EXTENDED RELEASE ORAL at 08:50

## 2024-11-10 RX ADMIN — LISINOPRIL 5 MG: 5 TABLET ORAL at 08:49

## 2024-11-10 RX ADMIN — FIDAXOMICIN 200 MG: 200 TABLET, FILM COATED ORAL at 20:52

## 2024-11-10 RX ADMIN — SODIUM CHLORIDE, PRESERVATIVE FREE 10 ML: 5 INJECTION INTRAVENOUS at 21:09

## 2024-11-10 RX ADMIN — SODIUM CHLORIDE, POTASSIUM CHLORIDE, SODIUM LACTATE AND CALCIUM CHLORIDE: 600; 310; 30; 20 INJECTION, SOLUTION INTRAVENOUS at 11:25

## 2024-11-10 ASSESSMENT — PAIN SCALES - GENERAL
PAINLEVEL_OUTOF10: 5
PAINLEVEL_OUTOF10: 7
PAINLEVEL_OUTOF10: 0
PAINLEVEL_OUTOF10: 0
PAINLEVEL_OUTOF10: 7
PAINLEVEL_OUTOF10: 7
PAINLEVEL_OUTOF10: 8
PAINLEVEL_OUTOF10: 7

## 2024-11-10 ASSESSMENT — PAIN DESCRIPTION - LOCATION
LOCATION: ABDOMEN

## 2024-11-10 ASSESSMENT — PAIN DESCRIPTION - ORIENTATION
ORIENTATION: MID
ORIENTATION: ANTERIOR

## 2024-11-10 ASSESSMENT — PAIN - FUNCTIONAL ASSESSMENT
PAIN_FUNCTIONAL_ASSESSMENT: ACTIVITIES ARE NOT PREVENTED
PAIN_FUNCTIONAL_ASSESSMENT: ACTIVITIES ARE NOT PREVENTED

## 2024-11-10 ASSESSMENT — PAIN DESCRIPTION - DESCRIPTORS
DESCRIPTORS: CRAMPING

## 2024-11-10 ASSESSMENT — PAIN DESCRIPTION - PAIN TYPE: TYPE: ACUTE PAIN

## 2024-11-10 NOTE — PROGRESS NOTES
4 Eyes Skin Assessment     NAME:  Henrry Martinez  YOB: 1960  MEDICAL RECORD NUMBER:  [id number]    The patient is being assessed for  Shift Handoff    I agree that at least one RN has performed a thorough Head to Toe Skin Assessment on the patient. ALL assessment sites listed below have been assessed.      Areas assessed by both nurses:    Head, Face, Ears, Shoulders, Back, Chest, Arms, Elbows, Hands, Sacrum. Buttock, Coccyx, Ischium, and Legs. Feet and Heels        Does the Patient have a Wound? No noted wound(s)       Prasanna Prevention initiated by RN: Yes  Wound Care Orders initiated by RN: No    Pressure Injury (Stage 3,4, Unstageable, DTI, NWPT, and Complex wounds) if present, place Wound referral order by RN under : No    New Ostomies, if present place, Ostomy referral order under : No     Nurse 1 eSignature: Electronically signed by Ivon Ibarra RN on 11/10/24 at 7:17 AM EST    **SHARE this note so that the co-signing nurse can place an eSignature**    Nurse 2 eSignature: Electronically signed by Hawa Fragoso RN on 11/10/24 at 8:08 AM EST

## 2024-11-10 NOTE — PROGRESS NOTES
[facility]  Progress Note    Patient: Henrry Martinez Age: 64 y.o. : 1960 MR#: [id number] SSN: [id number]  Date: 11/10/2024                 DVT Prophylaxis:  [x]Lovenox  []Hep SQ  []SCDs  []Coumadin   []On Heparin gtt []PO anticoagulant    Anticipated discharge: 2024 to home, pending clinical    Subjective:      The patient was seen and examined at the bedside.  He was resting in bed and was in no acute distress.  He said that he feels about the same as yesterday.  He continues to have multiple loose stools.  He also complained of diffuse abdominal pain.  He denied any nausea.  He is tolerating clear liquids so far.      Objective:   VS: /73   Pulse (!) 114   Temp 98.3 °F (36.8 °C) (Oral)   Resp 16   Ht 1.854 m (6' 1\")   Wt 95.3 kg (210 lb)   SpO2 98%   BMI 27.71 kg/m²    Tmax/24hrs: Temp (24hrs), Av.2 °F (36.8 °C), Min:97.6 °F (36.4 °C), Max:98.8 °F (37.1 °C)    Intake/Output Summary (Last 24 hours) at 11/10/2024 1756  Last data filed at 2024 2245  Gross per 24 hour   Intake 240 ml   Output --   Net 240 ml        PHYSICAL EXAM  General Appearance: No acute distress  HENT: normocephalic/atraumatic, moist mucus membranes  Neck: No JVD, supple  Lungs: CTA with normal respiratory effort  CV: RRR, no m/r/g  Abdomen: soft, diffusely tender but nondistended; bowel sounds slightly diminished; no organomegaly appreciated  : No Mason catheter; no suprapubic tenderness , no CVA tenderness  Extremities: no cyanosis, no peripheral edema  Neuro: No focal deficits, motor/sensory intact;   Skin: Facial flushing  Psych: appropriate mood and affect,      Current Facility-Administered Medications   Medication Dose Route Frequency    Fidaxomicin (DIFICID) tablet 200 mg  200 mg Oral BID    glucose chewable tablet 16 g  4 tablet Oral PRN    dextrose bolus 10% 125 mL  125 mL IntraVENous PRN    Or    dextrose bolus 10% 250 mL  250 mL  IntraVENous PRN    Glucagon Emergency SOLR 1 mg  1 mg SubCUTAneous PRN    dextrose 10 % infusion   IntraVENous Continuous PRN    insulin lispro (HUMALOG,ADMELOG) injection vial 0-8 Units  0-8 Units SubCUTAneous 4x Daily AC & HS    Gabapentin (Once-Daily) TABS 1,800 mg  1,800 mg Oral Daily    lisinopril (PRINIVIL;ZESTRIL) tablet 5 mg  5 mg Oral Daily    tiZANidine (ZANAFLEX) tablet 4 mg  4 mg Oral Q8H PRN    sertraline (ZOLOFT) tablet 200 mg  200 mg Oral Daily    insulin glargine (LANTUS) injection vial 12 Units  12 Units SubCUTAneous Nightly    insulin lispro (HUMALOG,ADMELOG) injection vial 3 Units  3 Units SubCUTAneous TID AC    lactated ringers infusion   IntraVENous Continuous    sodium chloride flush 0.9 % injection 5-40 mL  5-40 mL IntraVENous 2 times per day    sodium chloride flush 0.9 % injection 5-40 mL  5-40 mL IntraVENous PRN    0.9 % sodium chloride infusion   IntraVENous PRN    potassium chloride (KLOR-CON M) extended release tablet 40 mEq  40 mEq Oral PRN    Or    potassium bicarb-citric acid (EFFER-K) effervescent tablet 40 mEq  40 mEq Oral PRN    Or    potassium chloride 10 mEq/100 mL IVPB (Peripheral Line)  10 mEq IntraVENous PRN    magnesium sulfate 2000 mg in 50 mL IVPB premix  2,000 mg IntraVENous PRN    enoxaparin (LOVENOX) injection 40 mg  40 mg SubCUTAneous Daily    ondansetron (ZOFRAN-ODT) disintegrating tablet 4 mg  4 mg Oral Q8H PRN    Or    ondansetron (ZOFRAN) injection 4 mg  4 mg IntraVENous Q6H PRN    polyethylene glycol (GLYCOLAX) packet 17 g  17 g Oral Daily PRN    acetaminophen (TYLENOL) tablet 650 mg  650 mg Oral Q6H PRN    Or    acetaminophen (TYLENOL) suppository 650 mg  650 mg Rectal Q6H PRN    potassium chloride 10 mEq/100 mL IVPB (Peripheral Line)  10 mEq IntraVENous PRN    melatonin disintegrating tablet 5 mg  5 mg Oral Nightly PRN    ipratropium 0.5 mg-albuterol 2.5 mg (DUONEB) nebulizer solution 1 Dose  1 Dose Inhalation Q4H PRN    nitroglycerin (NITRO-BID) 2 % ointment 1

## 2024-11-10 NOTE — PLAN OF CARE
Problem: Occupational Therapy - Adult  Goal: By Discharge: Performs self-care activities at highest level of function for planned discharge setting.  See evaluation for individualized goals.  Description: Occupational Therapy Goals:  Initiated 11/10/2024 to be met within 7-10 days.      1.  Patient will perform grooming with modified independence while standing at the sink for > 5 min with Good balance.   2.  Patient will perform bathing with modified independence.  3.  Patient will perform lower body dressing with modified independence for seated and standing aspects.  4.  Patient will perform toilet transfers with modified independence.  5.  Patient will perform all aspects of toileting with modified independence.  6.  Patient will participate in upper extremity therapeutic exercise/activities with supervision/set-up for 8 minutes to improve endurance and UB strength needed for ADLs    7.  Patient will utilize energy conservation techniques during functional activities with verbal cues.    PLOF: Pt lives alone, independent.  Outcome: Progressing  OCCUPATIONAL THERAPY EVALUATION    Patient: Henrry Martinez (64 y.o. male)  Date: 11/10/2024  Primary Diagnosis: Dehydration [E86.0]  Colitis [K52.9]  Pancolitis (HCC) [K51.00]  Diarrhea, unspecified type [R19.7]  Hyperglycemia due to diabetes mellitus (HCC) [E11.65]  Sepsis (HCC) [A41.9]       Precautions: Contact Precautions, Fall Risk    ASSESSMENT :    Pt is agreeable to OT evaluation with encouragement, reports abdominal pain affecting his tolerance of activity. Pt demos difficulty reaching his feet for LB ADLs due to pain, required CGA and mult rest breaks for socks management using crossed LE technique. SBA for functional txfrs, due to lines management, decreased dynamic balance, and limited tolerance of activity. Pt educated on mult energy conservation techniques to utilize in home environment and while at the hospital, including pacing and deep breathing  stability, and prior level of function should also be taken into consideration.     SUBJECTIVE:   Patient stated, “I feel worse today.”    OBJECTIVE DATA SUMMARY:     Past Medical History:   Diagnosis Date    Diabetes mellitus, type 2 (HCC)     Dystonia     Hypertension    History reviewed. No pertinent surgical history.    Home Situation:   Social/Functional History  Lives With: Alone  Type of Home: House  Home Layout: One level  Home Access: Level entry  Bathroom Shower/Tub: Tub/Shower unit  Bathroom Toilet: Standard  Bathroom Accessibility: Accessible  Has the patient had two or more falls in the past year or any fall with injury in the past year?: No  Receives Help From: Friend(s), Neighbor  ADL Assistance: Independent  Homemaking Assistance: Independent  Ambulation Assistance: Independent  []  Right hand dominant   []  Left hand dominant    Cognitive/Behavioral Status:  Orientation  Overall Orientation Status: Within Functional Limits  Orientation Level: Oriented X4  Cognition  Overall Cognitive Status: WFL    Skin: visible skin intact  Edema: none noted    Vision/Perceptual:    Vision  Vision: Within Functional Limits        Coordination: BUE  Coordination: Generally decreased, functional        Balance:     Balance  Sitting: Intact  Standing: Impaired;Without support  Standing - Static: Good  Standing - Dynamic: Fair (plus)    Strength: BUE  Strength: Generally decreased, functional    Tone & Sensation: BUE  Tone: Normal  Sensation: Impaired (numbness in bilateral hands due to c/s issues chronic)    Range of Motion: BUE  AROM: Generally decreased, functional     Functional Mobility and Transfers for ADLs:  Bed Mobility:     Bed Mobility Training  Bed Mobility Training: Yes  Supine to Sit: Supervision  Sit to Supine: Supervision  Scooting: Supervision  Transfers:   Transfer Training  Sit to Stand: Stand-by assistance  Stand to Sit: Stand-by assistance    ADL Assessment:   Feeding: Modified independent

## 2024-11-10 NOTE — PROGRESS NOTES
[phone]    Gastroenterology follow up-Progress note    Impression:  1. Severe n/v and diarrhea with colitis on CT suggestive of infectious process, suspicious for c diff.  Sttol testing cancelled for unclear reasons.  2. Hyperglycemia   3. Hepatosplenomegaly, needs further eval once colitis process resolved     Plan:  1. We will resubmit the c diff specimen and empirically treat for c diff in light of the fact the pt is still quite symptomatic   2. Await GI pcr panel , would not use broad spectrum abxs at this point     Chief Complaint: severe diarrhea       Subjective:  Still having frequent loose non bloody stools with gen malaise.  No vomiting + nausea.  No fever no wbc.      ROS: Denies any fevers, chills, rash.       Patient Active Problem List   Diagnosis    Pancolitis (HCC)    Nausea and vomiting    Essential hypertension    Type 2 diabetes mellitus, without long-term current use of insulin (HCC)    Dystonia    Depression    Hyponatremia    Sepsis without acute organ dysfunction (HCC)         /71   Pulse (!) 112   Temp 98.8 °F (37.1 °C) (Oral)   Resp 18   Ht 1.854 m (6' 1\")   Wt 95.3 kg (210 lb)   SpO2 98%   BMI 27.71 kg/m²     General Appearance: ill appearing mild to moderate distress         Intake/Output Summary (Last 24 hours) at 11/10/2024 1233  Last data filed at 11/9/2024 2245  Gross per 24 hour   Intake 240 ml   Output --   Net 240 ml       CBC w/Diff    Lab Results   Component Value Date/Time    WBC 9.2 11/10/2024 04:53 AM    RBC 5.28 11/10/2024 04:53 AM    HGB 16.0 11/10/2024 04:53 AM    HCT 46.1 11/10/2024 04:53 AM    MCV 87.3 11/10/2024 04:53 AM    MCH 30.3 11/10/2024 04:53 AM    MCHC 34.7 11/10/2024 04:53 AM    RDW 13.1 11/10/2024 04:53 AM     11/10/2024 04:53 AM    MPV 9.5 11/10/2024 04:53 AM    No results found for: \"MONO\", \"BANDS\", \"BASOS\", \"METAS\", \"PRO\"   Basic Metabolic Profile   Recent Labs     11/08/24  1130 11/09/24  0324 11/10/24  0453   *   < >

## 2024-11-10 NOTE — PLAN OF CARE
Problem: Safety - Adult  Goal: Free from fall injury  Outcome: Progressing  Note: Pt ambulates to the bathroom w/o any assistive device. Gait observed to be steady.     Problem: Pain  Goal: Verbalizes/displays adequate comfort level or baseline comfort level  11/9/2024 2230 by Ivon Ibarra RN  Outcome: Progressing  11/9/2024 1312 by Hawa Fragoso RN  Outcome: Progressing  11/9/2024 0839 by Jai Luna RN  Outcome: Progressing  Flowsheets (Taken 11/9/2024 0839)  Verbalizes/displays adequate comfort level or baseline comfort level: Encourage patient to monitor pain and request assistance  Note: Patient has not stated any oain this shift      Problem: Chronic Conditions and Co-morbidities  Goal: Patient's chronic conditions and co-morbidity symptoms are monitored and maintained or improved  11/9/2024 2230 by Ivon Ibarra RN  Outcome: Progressing  11/9/2024 1312 by Hawa Fragoso RN  Outcome: Progressing  11/9/2024 0839 by Jai Luna RN  Outcome: Progressing  Flowsheets (Taken 11/9/2024 0839)  Care Plan - Patient's Chronic Conditions and Co-Morbidity Symptoms are Monitored and Maintained or Improved:   Collaborate with multidisciplinary team to address chronic and comorbid conditions and prevent exacerbation or deterioration   Monitor and assess patient's chronic conditions and comorbid symptoms for stability, deterioration, or improvement   Update acute care plan with appropriate goals if chronic or comorbid symptoms are exacerbated and prevent overall improvement and discharge     Problem: Discharge Planning  Goal: Discharge to home or other facility with appropriate resources  11/9/2024 2230 by Ivon Ibarra RN  Outcome: Progressing  11/9/2024 1312 by Hawa Fragoso RN  Outcome: Progressing  11/9/2024 0839 by Jai Luna RN  Outcome: Progressing  Flowsheets (Taken 11/9/2024 0839)  Discharge to home or other facility with appropriate resources: Identify barriers to discharge with

## 2024-11-10 NOTE — PLAN OF CARE
Problem: Occupational Therapy - Adult  Goal: By Discharge: Performs self-care activities at highest level of function for planned discharge setting.  See evaluation for individualized goals.  Description: Occupational Therapy Goals:  Initiated 11/10/2024 to be met within 7-10 days.      1.  Patient will perform grooming with modified independence while standing at the sink for > 5 min with Good balance.   2.  Patient will perform bathing with modified independence.  3.  Patient will perform lower body dressing with modified independence for seated and standing aspects.  4.  Patient will perform toilet transfers with modified independence.  5.  Patient will perform all aspects of toileting with modified independence.  6.  Patient will participate in upper extremity therapeutic exercise/activities with supervision/set-up for 8 minutes to improve endurance and UB strength needed for ADLs    7.  Patient will utilize energy conservation techniques during functional activities with verbal cues.    PLOF: Pt lives alone, independent.  11/10/2024 1344 by Dwaine Mills, OTR/L  Outcome: Progressing

## 2024-11-11 PROBLEM — A04.72 C. DIFFICILE COLITIS: Status: ACTIVE | Noted: 2024-11-11

## 2024-11-11 LAB
ANION GAP SERPL CALC-SCNC: 11 MMOL/L (ref 3–18)
BASOPHILS # BLD: 0.1 K/UL (ref 0–0.1)
BASOPHILS NFR BLD: 1 % (ref 0–2)
BUN SERPL-MCNC: 13 MG/DL (ref 7–18)
BUN/CREAT SERPL: 21 (ref 12–20)
C DIFF GDH STL QL: POSITIVE
C DIFF TOX A+B STL QL IA: POSITIVE
C DIFF TOXIN INTERPRETATION: ABNORMAL
CALCIUM SERPL-MCNC: 8.4 MG/DL (ref 8.5–10.1)
CHLORIDE SERPL-SCNC: 105 MMOL/L (ref 100–111)
CO2 SERPL-SCNC: 20 MMOL/L (ref 21–32)
CREAT SERPL-MCNC: 0.63 MG/DL (ref 0.6–1.3)
DIFFERENTIAL METHOD BLD: ABNORMAL
EOSINOPHIL # BLD: 0.1 K/UL (ref 0–0.4)
EOSINOPHIL NFR BLD: 1 % (ref 0–5)
ERYTHROCYTE [DISTWIDTH] IN BLOOD BY AUTOMATED COUNT: 13 % (ref 11.6–14.5)
EST. AVERAGE GLUCOSE BLD GHB EST-MCNC: 278 MG/DL
GLUCOSE BLD STRIP.AUTO-MCNC: 174 MG/DL (ref 70–110)
GLUCOSE BLD STRIP.AUTO-MCNC: 237 MG/DL (ref 70–110)
GLUCOSE BLD STRIP.AUTO-MCNC: 240 MG/DL (ref 70–110)
GLUCOSE BLD STRIP.AUTO-MCNC: 268 MG/DL (ref 70–110)
GLUCOSE SERPL-MCNC: 233 MG/DL (ref 74–99)
HBA1C MFR BLD: 11.3 % (ref 4.2–5.6)
HCT VFR BLD AUTO: 47.6 % (ref 36–48)
HGB BLD-MCNC: 16.6 G/DL (ref 13–16)
IMM GRANULOCYTES # BLD AUTO: 0.2 K/UL (ref 0–0.04)
IMM GRANULOCYTES NFR BLD AUTO: 2 % (ref 0–0.5)
LACTATE SERPL-SCNC: 0.8 MMOL/L (ref 0.4–2)
LYMPHOCYTES # BLD: 2 K/UL (ref 0.9–3.6)
LYMPHOCYTES NFR BLD: 19 % (ref 21–52)
MAGNESIUM SERPL-MCNC: 1.9 MG/DL (ref 1.6–2.6)
MCH RBC QN AUTO: 30.1 PG (ref 24–34)
MCHC RBC AUTO-ENTMCNC: 34.9 G/DL (ref 31–37)
MCV RBC AUTO: 86.4 FL (ref 78–100)
MONOCYTES # BLD: 1.2 K/UL (ref 0.05–1.2)
MONOCYTES NFR BLD: 11 % (ref 3–10)
NEUTS SEG # BLD: 6.9 K/UL (ref 1.8–8)
NEUTS SEG NFR BLD: 66 % (ref 40–73)
NRBC # BLD: 0 K/UL (ref 0–0.01)
NRBC BLD-RTO: 0 PER 100 WBC
PLATELET # BLD AUTO: 173 K/UL (ref 135–420)
PMV BLD AUTO: 9.6 FL (ref 9.2–11.8)
POTASSIUM SERPL-SCNC: 3.2 MMOL/L (ref 3.5–5.5)
RBC # BLD AUTO: 5.51 M/UL (ref 4.35–5.65)
SODIUM SERPL-SCNC: 136 MMOL/L (ref 136–145)
WBC # BLD AUTO: 10.5 K/UL (ref 4.6–13.2)

## 2024-11-11 PROCEDURE — 6360000002 HC RX W HCPCS: Performed by: INTERNAL MEDICINE

## 2024-11-11 PROCEDURE — 2580000003 HC RX 258: Performed by: STUDENT IN AN ORGANIZED HEALTH CARE EDUCATION/TRAINING PROGRAM

## 2024-11-11 PROCEDURE — 6370000000 HC RX 637 (ALT 250 FOR IP): Performed by: STUDENT IN AN ORGANIZED HEALTH CARE EDUCATION/TRAINING PROGRAM

## 2024-11-11 PROCEDURE — 99232 SBSQ HOSP IP/OBS MODERATE 35: CPT | Performed by: STUDENT IN AN ORGANIZED HEALTH CARE EDUCATION/TRAINING PROGRAM

## 2024-11-11 PROCEDURE — 6370000000 HC RX 637 (ALT 250 FOR IP): Performed by: INTERNAL MEDICINE

## 2024-11-11 PROCEDURE — 80048 BASIC METABOLIC PNL TOTAL CA: CPT

## 2024-11-11 PROCEDURE — 83605 ASSAY OF LACTIC ACID: CPT

## 2024-11-11 PROCEDURE — 2580000003 HC RX 258: Performed by: INTERNAL MEDICINE

## 2024-11-11 PROCEDURE — 36415 COLL VENOUS BLD VENIPUNCTURE: CPT

## 2024-11-11 PROCEDURE — 94761 N-INVAS EAR/PLS OXIMETRY MLT: CPT

## 2024-11-11 PROCEDURE — 83735 ASSAY OF MAGNESIUM: CPT

## 2024-11-11 PROCEDURE — 82962 GLUCOSE BLOOD TEST: CPT

## 2024-11-11 PROCEDURE — 83036 HEMOGLOBIN GLYCOSYLATED A1C: CPT

## 2024-11-11 PROCEDURE — 85025 COMPLETE CBC W/AUTO DIFF WBC: CPT

## 2024-11-11 PROCEDURE — 1100000000 HC RM PRIVATE

## 2024-11-11 RX ORDER — GABAPENTIN 600 MG/1
1800 TABLET, FILM COATED ORAL DAILY
Status: DISCONTINUED | OUTPATIENT
Start: 2024-11-11 | End: 2024-11-16 | Stop reason: HOSPADM

## 2024-11-11 RX ORDER — INSULIN LISPRO 100 [IU]/ML
7 INJECTION, SOLUTION INTRAVENOUS; SUBCUTANEOUS
Status: DISCONTINUED | OUTPATIENT
Start: 2024-11-11 | End: 2024-11-12

## 2024-11-11 RX ORDER — INSULIN GLARGINE 100 [IU]/ML
21 INJECTION, SOLUTION SUBCUTANEOUS NIGHTLY
Status: DISCONTINUED | OUTPATIENT
Start: 2024-11-11 | End: 2024-11-12

## 2024-11-11 RX ADMIN — INSULIN LISPRO 2 UNITS: 100 INJECTION, SOLUTION INTRAVENOUS; SUBCUTANEOUS at 21:00

## 2024-11-11 RX ADMIN — SODIUM CHLORIDE, PRESERVATIVE FREE 10 ML: 5 INJECTION INTRAVENOUS at 21:00

## 2024-11-11 RX ADMIN — ONDANSETRON 4 MG: 2 INJECTION, SOLUTION INTRAMUSCULAR; INTRAVENOUS at 17:15

## 2024-11-11 RX ADMIN — POTASSIUM CHLORIDE 40 MEQ: 1500 TABLET, EXTENDED RELEASE ORAL at 09:32

## 2024-11-11 RX ADMIN — SODIUM CHLORIDE, POTASSIUM CHLORIDE, SODIUM LACTATE AND CALCIUM CHLORIDE: 600; 310; 30; 20 INJECTION, SOLUTION INTRAVENOUS at 22:09

## 2024-11-11 RX ADMIN — INSULIN LISPRO 5 UNITS: 100 INJECTION, SOLUTION INTRAVENOUS; SUBCUTANEOUS at 09:32

## 2024-11-11 RX ADMIN — FIDAXOMICIN 200 MG: 200 TABLET, FILM COATED ORAL at 09:32

## 2024-11-11 RX ADMIN — INSULIN GLARGINE 21 UNITS: 100 INJECTION, SOLUTION SUBCUTANEOUS at 21:00

## 2024-11-11 RX ADMIN — LISINOPRIL 5 MG: 5 TABLET ORAL at 09:32

## 2024-11-11 RX ADMIN — SERTRALINE HYDROCHLORIDE 200 MG: 50 TABLET ORAL at 09:32

## 2024-11-11 RX ADMIN — INSULIN LISPRO 2 UNITS: 100 INJECTION, SOLUTION INTRAVENOUS; SUBCUTANEOUS at 09:35

## 2024-11-11 RX ADMIN — ENOXAPARIN SODIUM 40 MG: 100 INJECTION SUBCUTANEOUS at 17:15

## 2024-11-11 RX ADMIN — INSULIN LISPRO 4 UNITS: 100 INJECTION, SOLUTION INTRAVENOUS; SUBCUTANEOUS at 12:12

## 2024-11-11 RX ADMIN — FIDAXOMICIN 200 MG: 200 TABLET, FILM COATED ORAL at 21:00

## 2024-11-11 RX ADMIN — ONDANSETRON 4 MG: 2 INJECTION, SOLUTION INTRAMUSCULAR; INTRAVENOUS at 09:30

## 2024-11-11 RX ADMIN — SODIUM CHLORIDE, PRESERVATIVE FREE 10 ML: 5 INJECTION INTRAVENOUS at 09:32

## 2024-11-11 RX ADMIN — INSULIN LISPRO 7 UNITS: 100 INJECTION, SOLUTION INTRAVENOUS; SUBCUTANEOUS at 12:14

## 2024-11-11 ASSESSMENT — PAIN SCALES - GENERAL
PAINLEVEL_OUTOF10: 0

## 2024-11-11 NOTE — PLAN OF CARE
Problem: Gastrointestinal - Adult  Goal: Minimal or absence of nausea and vomiting  11/11/2024 1146 by Jazz Kimble, RN  Outcome: Progressing  Flowsheets (Taken 11/11/2024 0800)  Minimal or absence of nausea and vomiting:   Administer IV fluids as ordered to ensure adequate hydration   Administer ordered antiemetic medications as needed   Provide nonpharmacologic comfort measures as appropriate  Note: Monitor for nausea and vomiting, administer ordered antianemic to relieve nausea and assess effectiveness, provide nonpharmacologic comfort        Problem: Gastrointestinal - Adult  Goal: Maintains or returns to baseline bowel function  Outcome: Progressing  Flowsheets (Taken 11/11/2024 0800)  Maintains or returns to baseline bowel function:   Assess bowel function   Encourage oral fluids to ensure adequate hydration   Administer IV fluids as ordered to ensure adequate hydration  Note: Assess pt's bowel movement, ensure adequate hydration, treat c-diff infection    Problem: Gastrointestinal - Adult  Goal: Maintains adequate nutritional intake  Outcome: Progressing  Flowsheets (Taken 11/11/2024 0800)  Maintains adequate nutritional intake:   Monitor percentage of each meal consumed   Identify factors contributing to decreased intake, treat as appropriate   Monitor intake and output, weight and lab values  Note: Advance diet as appropriate, provide pt with desired foods that are consistent with diet     Problem: Metabolic/Fluid and Electrolytes - Adult  Goal: Electrolytes maintained within normal limits  Outcome: Progressing  Flowsheets (Taken 11/11/2024 0800)  Electrolytes maintained within normal limits:   Monitor labs and assess patient for signs and symptoms of electrolyte imbalances   Administer electrolyte replacement as ordered   Monitor response to electrolyte replacements, including repeat lab results as appropriate   Note: Administer ordered electrolyte replacement, monitor pt's potassium     Problem:  Metabolic/Fluid and Electrolytes - Adult  Goal: Glucose maintained within prescribed range  Outcome: Progressing  Flowsheets (Taken 11/11/2024 0800)  Glucose maintained within prescribed range:   Monitor blood glucose as ordered   Assess for signs and symptoms of hyperglycemia and hypoglycemia   Administer ordered medications to maintain glucose within target range  Note: Monitor pt's blood sugar, administer insulin as appropriate based on order and sliding scale, educate pt on appropriate diet to assist in controlling sugar     Problem: Infection - Adult  Goal: Absence of infection at discharge  Outcome: Progressing  Flowsheets (Taken 11/11/2024 0800)  Absence of infection at discharge:   Assess and monitor for signs and symptoms of infection   Monitor lab/diagnostic results   Administer medications as ordered   Instruct and encourage patient and family to use good hand hygiene technique  Note: Monitor WBCs, Monitor for fever, Administer appropriate antibiotic for infection     Problem: Musculoskeletal - Adult  Goal: Return mobility to safest level of function  Outcome: Progressing  Flowsheets (Taken 11/11/2024 0800)  Return Mobility to Safest Level of Function:   Assess patient stability and activity tolerance for standing, transferring and ambulating with or without assistive devices   Assist with transfers and ambulation using safe patient handling equipment as needed  Note: Monitor pt's stability in transfers, encourage ambulation as tolerated and with assistance as needed     Problem: Musculoskeletal - Adult  Goal: Return ADL status to a safe level of function  Outcome: Progressing  Flowsheets (Taken 11/11/2024 0800)  Return ADL Status to a Safe Level of Function: Assess activities of daily living deficits and provide assistive devices as needed  Note: Assess pt's ability to feed, groom, and bath self     Problem: Pain  Goal: Verbalizes/displays adequate comfort level or baseline comfort level  11/11/2024 6650

## 2024-11-11 NOTE — PROGRESS NOTES
4 Eyes Skin Assessment     NAME:  Henrry Martinez  YOB: 1960  MEDICAL RECORD NUMBER:  [id number]    The patient is being assessed for  Shift Handoff    I agree that at least one RN has performed a thorough Head to Toe Skin Assessment on the patient. ALL assessment sites listed below have been assessed.      Areas assessed by both nurses:    Head, Face, Ears, Shoulders, Back, Chest, Arms, Elbows, Hands, Sacrum. Buttock, Coccyx, Ischium, Legs. Feet and Heels, and Under Medical Devices         Does the Patient have a Wound? No noted wound(s)       Prasanna Prevention initiated by RN: No  Wound Care Orders initiated by RN: No    Pressure Injury (Stage 3,4, Unstageable, DTI, NWPT, and Complex wounds) if present, place Wound referral order by RN under : No    New Ostomies, if present place, Ostomy referral order under : No     Nurse 1 eSignature: Electronically signed by Hawa Fragoso RN on 11/10/24 at 7:26 PM EST    **SHARE this note so that the co-signing nurse can place an eSignature**    Nurse 2 eSignature: {Esignature:[id number]}

## 2024-11-11 NOTE — PROGRESS NOTES
Comprehensive Nutrition Assessment    Type and Reason for Visit:  Initial, Positive nutrition screen    Nutrition Recommendations/Plan:   Agree with current plan, will monitor weight/po intake     Malnutrition Assessment:  Malnutrition Status:  Insufficient data (11/11/24 1608)    Context:  Acute Illness     Findings of the 6 clinical characteristics of malnutrition:  Energy Intake:  Mild decrease in energy intake (PO intake poor since admission (3 days))  Weight Loss:  Mild weight loss (6# weight loss per EMR review)     Body Fat Loss:  Unable to assess     Muscle Mass Loss:  Unable to assess    Fluid Accumulation:  Unable to assess     Strength:  Not Performed    Nutrition History and Allergies:   PMH of DM, HTN, Depression, cervical spinal stenosis, cellulitis; NKFA        11/8/2024    11:22 AM 10/5/2024     2:51 PM 1/26/2024     3:29 PM 1/12/2024     4:39 PM 8/8/2023    11:30 PM 7/13/2023     9:54 PM   Weight Metrics   Weight 210 lb 210 lb 217 lb 215 lb 198 lb 205 lb   BMI (Calculated) 27.8 kg/m2 27 kg/m2 27.9 kg/m2 27.7 kg/m2 0 kg/m2 26.4 kg/m2        Nutrition Assessment:    Assessment secondary to positive nutrition screen, MST score of 2- weight loss and decreased appetite. Pt presented with N&V, diarrhea x 4 days Dx - C diff colitis hyponatremia hypokalemia    Nutrition Related Findings:    +diarrhea, nausea. Tolerating CLD advanced to FLD. Labs K+ 3.2 A1c 11.3 medications reviewed includes insulin Wound Type: None       Current Nutrition Intake & Therapies:    Average Meal Intake: 1-25%  Average Supplements Intake: None Ordered  ADULT ORAL NUTRITION SUPPLEMENT; Breakfast, Lunch, Dinner; Low Calorie/High Protein Oral Supplement  ADULT DIET; Full Liquid; 4 carb choices (60 gm/meal)    Anthropometric Measures:  Height: 185.4 cm (6' 0.99\")  Ideal Body Weight (IBW): 184 lbs (84 kg)    Admission Body Weight: 95.3 kg (210 lb 1.6 oz)  Current Body Weight: 95.3 kg (210 lb 1.6 oz), 114.2 % IBW.    Current BMI

## 2024-11-11 NOTE — PLAN OF CARE
Problem: Chronic Conditions and Co-morbidities  Goal: Patient's chronic conditions and co-morbidity symptoms are monitored and maintained or improved  Flowsheets (Taken 11/9/2024 2000 by Ivon Ibarra RN)  Care Plan - Patient's Chronic Conditions and Co-Morbidity Symptoms are Monitored and Maintained or Improved:   Monitor and assess patient's chronic conditions and comorbid symptoms for stability, deterioration, or improvement   Collaborate with multidisciplinary team to address chronic and comorbid conditions and prevent exacerbation or deterioration   Update acute care plan with appropriate goals if chronic or comorbid symptoms are exacerbated and prevent overall improvement and discharge     Problem: Pain  Goal: Verbalizes/displays adequate comfort level or baseline comfort level  Flowsheets (Taken 11/9/2024 0839 by Jai Luna, RN)  Verbalizes/displays adequate comfort level or baseline comfort level: Encourage patient to monitor pain and request assistance

## 2024-11-11 NOTE — PROGRESS NOTES
[phone]    Gastroenterology follow up-Progress note    Impression:  Pt presented to ED 11/8/24 with c/o NVD x4 days with ABD pain. PMH: LE cellulitis with recent abx, Sxs started after eating fried chicken.    CT revealed severe pancolitis with an incidental finding of mild hepatosplemnomegaly. Stool sample C. Diff +, started Dificid over the weekend empirically, today, stool test was +C. Diff.    Plan:  Defer colonoscopy at this time to outpatient. Colonoscopy during active infection poses a higher than average risk, and will not contribute new, actionable information. Focus should be on abx and sxs management.     - Agree with ABX Dificid for C. Diff  - Agree with antiemetics prn, consider pretreatment prior to meals  - Needs outpatient colonoscopy once colitis has resolved as follow up and he was due for 10yr colonoscopy CRCS recall 7/2024. Dr. Cl Angelo at [facility] (colonoscopy 2014) or our office per pt preference.      Chief Complaint: NVD, ABD pain      Subjective:  Pt seen resting in bed, NAD. Still has some NV, but would like diet advanced. Some persistent ABD pain without. BMs have slowed since starting Dificid.    ROS: Denies any fevers, chills, rash.       Patient Active Problem List   Diagnosis    Pancolitis (HCC)    Nausea and vomiting    Essential hypertension    Type 2 diabetes mellitus, without long-term current use of insulin (HCC)    Dystonia    Depression    Hyponatremia    Sepsis without acute organ dysfunction (HCC)    Sepsis (HCC)         /68   Pulse 92   Temp 98.4 °F (36.9 °C) (Oral)   Resp 16   Ht 1.854 m (6' 1\")   Wt 95.3 kg (210 lb)   SpO2 98%   BMI 27.71 kg/m²     General Appearance: No acute distress, facial flushing  HENT: normocephalic/atraumatic  Neck: No JVD  Lungs: CTA with normal respiratory effort  CV: RRR, no m/r/g  Abdomen: soft, mild tenderness to light palpation; bowel sounds slightly diminished; no organomegaly appreciated  : No

## 2024-11-11 NOTE — PROGRESS NOTES
[facility]  Progress Note    Patient: Henrry Martinez Age: 64 y.o. : 1960 MR#: [id number] SSN: [id number]  Date: 2024                 DVT Prophylaxis:  [x]Lovenox  []Hep SQ  []SCDs  []Coumadin   []On Heparin gtt []PO anticoagulant    Anticipated discharge: 2024 to home, pending clinical    Subjective:      The patient was seen and examined at the bedside.   He said that he feels about the same as yesterday.  He still has abdominal pain.  The patient said that he also had 2 episodes of nonbloody emesis yesterday.  However, he is tolerating a clear liquid diet without any difficulty.  He would like his diet to be advanced.      Objective:   VS: BP (!) 142/86   Pulse (!) 110   Temp 98.4 °F (36.9 °C) (Oral)   Resp 17   Ht 1.854 m (6' 1\")   Wt 95.3 kg (210 lb)   SpO2 98%   BMI 27.71 kg/m²    Tmax/24hrs: Temp (24hrs), Av.4 °F (36.9 °C), Min:98.3 °F (36.8 °C), Max:98.8 °F (37.1 °C)    Intake/Output Summary (Last 24 hours) at 2024 1052  Last data filed at 11/10/2024 1228  Gross per 24 hour   Intake 120 ml   Output --   Net 120 ml        PHYSICAL EXAM  General Appearance: No acute distress  HENT: normocephalic/atraumatic, moist mucus membranes  Neck: No JVD, supple  Lungs: CTA with normal respiratory effort  CV: RRR, no m/r/g  Abdomen: soft, diffusely tender but nondistended; bowel sounds slightly diminished; no organomegaly appreciated  : No Mason catheter; no suprapubic tenderness , no CVA tenderness  Extremities: no cyanosis, no peripheral edema  Neuro: No focal deficits, motor/sensory intact;   Skin: Facial flushing  Psych: appropriate mood and affect,      Current Facility-Administered Medications   Medication Dose Route Frequency    [Held by provider] Gabapentin (Once-Daily) TABS 1,800 mg (Patient Supplied)  1,800 mg Oral Daily    Fidaxomicin (DIFICID) tablet 200 mg  200 mg Oral BID    insulin glargine (LANTUS) injection

## 2024-11-11 NOTE — PROGRESS NOTES
Spiritual Health History and Assessment/Progress Note  [facility]    Spiritual/Emotional Needs,  ,  ,      Name: Henrry Martinez MRN: [id number]    Age: 64 y.o.     Sex: male   Language: English   Jehovah's witness: Gnosticist   Colitis     Date: 11/11/2024            Total Time Calculated: 7 min              Spiritual Assessment began in [facility] MEDICAL        Referral/Consult From: Rounding   Encounter Overview/Reason: Spiritual/Emotional Needs  Service Provided For: Patient    Reema, Belief, Meaning:   Patient has beliefs or practices that help with coping during difficult times  Family/Friends No family/friends present      Importance and Influence:  Patient has spiritual/personal beliefs that influence decisions regarding their health  Family/Friends No family/friends present    Community:  Patient feels well-supported. Support system includes: Friends  Family/Friends No family/friends present    Assessment and Plan of Care:     Patient Interventions include: Facilitated expression of thoughts and feelings  Family/Friends Interventions include: No family/friends present    Patient Plan of Care: Spiritual Care available upon further referral  Family/Friends Plan of Care: No family/friends present    Electronically signed by MICHAEL Henry on 11/11/2024 at 5:07 PM

## 2024-11-12 LAB
ANION GAP SERPL CALC-SCNC: 8 MMOL/L (ref 3–18)
BASOPHILS # BLD: 0.1 K/UL (ref 0–0.1)
BASOPHILS NFR BLD: 1 % (ref 0–2)
BUN SERPL-MCNC: 13 MG/DL (ref 7–18)
BUN/CREAT SERPL: 22 (ref 12–20)
CALCIUM SERPL-MCNC: 7.9 MG/DL (ref 8.5–10.1)
CHLORIDE SERPL-SCNC: 108 MMOL/L (ref 100–111)
CO2 SERPL-SCNC: 20 MMOL/L (ref 21–32)
CREAT SERPL-MCNC: 0.58 MG/DL (ref 0.6–1.3)
DIFFERENTIAL METHOD BLD: ABNORMAL
EOSINOPHIL # BLD: 0.2 K/UL (ref 0–0.4)
EOSINOPHIL NFR BLD: 3 % (ref 0–5)
ERYTHROCYTE [DISTWIDTH] IN BLOOD BY AUTOMATED COUNT: 13.1 % (ref 11.6–14.5)
GLUCOSE BLD STRIP.AUTO-MCNC: 188 MG/DL (ref 70–110)
GLUCOSE BLD STRIP.AUTO-MCNC: 216 MG/DL (ref 70–110)
GLUCOSE BLD STRIP.AUTO-MCNC: 222 MG/DL (ref 70–110)
GLUCOSE BLD STRIP.AUTO-MCNC: 281 MG/DL (ref 70–110)
GLUCOSE SERPL-MCNC: 245 MG/DL (ref 74–99)
HCT VFR BLD AUTO: 44.9 % (ref 36–48)
HGB BLD-MCNC: 15.6 G/DL (ref 13–16)
IMM GRANULOCYTES # BLD AUTO: 0.3 K/UL (ref 0–0.04)
IMM GRANULOCYTES NFR BLD AUTO: 3 % (ref 0–0.5)
LYMPHOCYTES # BLD: 2.3 K/UL (ref 0.9–3.6)
LYMPHOCYTES NFR BLD: 28 % (ref 21–52)
MAGNESIUM SERPL-MCNC: 1.9 MG/DL (ref 1.6–2.6)
MCH RBC QN AUTO: 30.2 PG (ref 24–34)
MCHC RBC AUTO-ENTMCNC: 34.7 G/DL (ref 31–37)
MCV RBC AUTO: 87 FL (ref 78–100)
MONOCYTES # BLD: 0.7 K/UL (ref 0.05–1.2)
MONOCYTES NFR BLD: 8 % (ref 3–10)
NEUTS SEG # BLD: 4.7 K/UL (ref 1.8–8)
NEUTS SEG NFR BLD: 58 % (ref 40–73)
NRBC # BLD: 0 K/UL (ref 0–0.01)
NRBC BLD-RTO: 0 PER 100 WBC
PLATELET # BLD AUTO: 165 K/UL (ref 135–420)
PMV BLD AUTO: 9.5 FL (ref 9.2–11.8)
POTASSIUM SERPL-SCNC: 3.2 MMOL/L (ref 3.5–5.5)
RBC # BLD AUTO: 5.16 M/UL (ref 4.35–5.65)
SODIUM SERPL-SCNC: 136 MMOL/L (ref 136–145)
WBC # BLD AUTO: 8.2 K/UL (ref 4.6–13.2)

## 2024-11-12 PROCEDURE — 6360000002 HC RX W HCPCS: Performed by: INTERNAL MEDICINE

## 2024-11-12 PROCEDURE — 1100000000 HC RM PRIVATE

## 2024-11-12 PROCEDURE — 85025 COMPLETE CBC W/AUTO DIFF WBC: CPT

## 2024-11-12 PROCEDURE — 6370000000 HC RX 637 (ALT 250 FOR IP): Performed by: INTERNAL MEDICINE

## 2024-11-12 PROCEDURE — 82962 GLUCOSE BLOOD TEST: CPT

## 2024-11-12 PROCEDURE — 2580000003 HC RX 258: Performed by: INTERNAL MEDICINE

## 2024-11-12 PROCEDURE — 36415 COLL VENOUS BLD VENIPUNCTURE: CPT

## 2024-11-12 PROCEDURE — 6370000000 HC RX 637 (ALT 250 FOR IP): Performed by: STUDENT IN AN ORGANIZED HEALTH CARE EDUCATION/TRAINING PROGRAM

## 2024-11-12 PROCEDURE — 83735 ASSAY OF MAGNESIUM: CPT

## 2024-11-12 PROCEDURE — 99232 SBSQ HOSP IP/OBS MODERATE 35: CPT | Performed by: STUDENT IN AN ORGANIZED HEALTH CARE EDUCATION/TRAINING PROGRAM

## 2024-11-12 PROCEDURE — 80048 BASIC METABOLIC PNL TOTAL CA: CPT

## 2024-11-12 PROCEDURE — 94761 N-INVAS EAR/PLS OXIMETRY MLT: CPT

## 2024-11-12 RX ORDER — INSULIN GLARGINE 100 [IU]/ML
30 INJECTION, SOLUTION SUBCUTANEOUS NIGHTLY
Status: DISCONTINUED | OUTPATIENT
Start: 2024-11-12 | End: 2024-11-13

## 2024-11-12 RX ORDER — INSULIN LISPRO 100 [IU]/ML
10 INJECTION, SOLUTION INTRAVENOUS; SUBCUTANEOUS
Status: DISCONTINUED | OUTPATIENT
Start: 2024-11-12 | End: 2024-11-16 | Stop reason: HOSPADM

## 2024-11-12 RX ADMIN — SODIUM CHLORIDE, PRESERVATIVE FREE 10 ML: 5 INJECTION INTRAVENOUS at 20:22

## 2024-11-12 RX ADMIN — FIDAXOMICIN 200 MG: 200 TABLET, FILM COATED ORAL at 20:21

## 2024-11-12 RX ADMIN — POTASSIUM CHLORIDE 40 MEQ: 1500 TABLET, EXTENDED RELEASE ORAL at 10:07

## 2024-11-12 RX ADMIN — ENOXAPARIN SODIUM 40 MG: 100 INJECTION SUBCUTANEOUS at 17:41

## 2024-11-12 RX ADMIN — FIDAXOMICIN 200 MG: 200 TABLET, FILM COATED ORAL at 10:07

## 2024-11-12 RX ADMIN — LISINOPRIL 5 MG: 5 TABLET ORAL at 10:07

## 2024-11-12 RX ADMIN — INSULIN GLARGINE 30 UNITS: 100 INJECTION, SOLUTION SUBCUTANEOUS at 20:21

## 2024-11-12 RX ADMIN — INSULIN LISPRO 7 UNITS: 100 INJECTION, SOLUTION INTRAVENOUS; SUBCUTANEOUS at 10:07

## 2024-11-12 RX ADMIN — ONDANSETRON 4 MG: 2 INJECTION, SOLUTION INTRAMUSCULAR; INTRAVENOUS at 10:06

## 2024-11-12 RX ADMIN — INSULIN LISPRO 10 UNITS: 100 INJECTION, SOLUTION INTRAVENOUS; SUBCUTANEOUS at 17:41

## 2024-11-12 RX ADMIN — INSULIN LISPRO 7 UNITS: 100 INJECTION, SOLUTION INTRAVENOUS; SUBCUTANEOUS at 12:29

## 2024-11-12 RX ADMIN — ONDANSETRON 4 MG: 2 INJECTION, SOLUTION INTRAMUSCULAR; INTRAVENOUS at 17:41

## 2024-11-12 RX ADMIN — SERTRALINE HYDROCHLORIDE 200 MG: 50 TABLET ORAL at 10:07

## 2024-11-12 RX ADMIN — INSULIN LISPRO 4 UNITS: 100 INJECTION, SOLUTION INTRAVENOUS; SUBCUTANEOUS at 12:28

## 2024-11-12 RX ADMIN — INSULIN LISPRO 2 UNITS: 100 INJECTION, SOLUTION INTRAVENOUS; SUBCUTANEOUS at 10:11

## 2024-11-12 ASSESSMENT — PAIN SCALES - GENERAL
PAINLEVEL_OUTOF10: 0

## 2024-11-12 NOTE — PROGRESS NOTES
[facility] Hospitalist Group  Progress Note    Patient: Henrry Martinez Age: 64 y.o. : 1960 MR#: [id number] SSN: [id number]  Date: 2024                 DVT Prophylaxis:  [x]Lovenox  []Hep SQ  []SCDs  []Coumadin   []On Heparin gtt []PO anticoagulant    Anticipated discharge: 2024 to home, pending clinical course and improvement of diarrhea    Subjective:      The patient was seen and examined at the bedside.   His abdominal pain has greatly improved but he continues to have profuse diarrhea.  Patient said that he has had 10 episodes of loose stool since morning.      Objective:   VS: /81   Pulse (!) 104   Temp 97.2 °F (36.2 °C) (Oral)   Resp 17   Ht 1.854 m (6' 0.99\")   Wt 95.3 kg (210 lb)   SpO2 99%   BMI 27.71 kg/m²    Tmax/24hrs: Temp (24hrs), Av.7 °F (36.5 °C), Min:97.2 °F (36.2 °C), Max:98.2 °F (36.8 °C)    Intake/Output Summary (Last 24 hours) at 2024 1510  Last data filed at 2024 2150  Gross per 24 hour   Intake 240 ml   Output --   Net 240 ml        PHYSICAL EXAM  General Appearance: No acute distress  HENT: normocephalic/atraumatic, moist mucus membranes  Neck: No JVD, supple  Lungs: CTA with normal respiratory effort  CV: RRR, no m/r/g  Abdomen: soft, mildly tender but nondistended; bowel sounds slightly diminished; no organomegaly appreciated  : No Mason catheter; no suprapubic tenderness , no CVA tenderness  Extremities: no cyanosis, no peripheral edema  Neuro: No focal deficits, motor/sensory intact;   Skin: Warm and dry  Psych: appropriate mood and affect,      Current Facility-Administered Medications   Medication Dose Route Frequency    [Held by provider] Gabapentin (Once-Daily) TABS 1,800 mg (Patient Supplied)  1,800 mg Oral Daily    insulin glargine (LANTUS) injection vial 21 Units  21 Units SubCUTAneous Nightly    insulin lispro (HUMALOG,ADMELOG) injection vial 7 Units  7 Units SubCUTAneous TID AC

## 2024-11-12 NOTE — CARE COORDINATION
11/12/24 0851   Service Assessment   Patient Orientation Alert and Oriented   Cognition Alert   History Provided By Patient   Primary Caregiver Self   Accompanied By/Relationship no one at bedside   Support Systems Spouse/Significant Other;Friends/Neighbors   Patient's Healthcare Decision Maker is: Patient Declined (Legal Next of Kin Remains as Decision Maker)   PCP Verified by CM Yes  (Needs PCP)   Prior Functional Level Independent in ADLs/IADLs   Current Functional Level Independent in ADLs/IADLs   Can patient return to prior living arrangement Yes   Ability to make needs known: Good   Family able to assist with home care needs: Yes   Would you like for me to discuss the discharge plan with any other family members/significant others, and if so, who? Yes  (Leena Mckeon- girlfriend)   Financial Resources None   Community Resources None   CM/SW Referral No PCP   Social/Functional History   Lives With Alone   Type of Home House   Home Layout One level   Home Access Level entry   Bathroom Shower/Tub Tub/Shower unit   Bathroom Toilet Standard   Bathroom Equipment Grab bars in shower   Bathroom Accessibility Accessible   Home Equipment Cane   Receives Help From Friend(s);Neighbor   ADL Assistance Independent   Homemaking Assistance Independent   Homemaking Responsibilities Yes   Ambulation Assistance Independent   Transfer Assistance Independent   Active  Yes   Mode of Transportation Truck   Education   (not applicable)   Occupation Part time employment   Type of Occupation Maintenance   Discharge Planning   Type of Residence House   Living Arrangements Alone   Current Services Prior To Admission None   Potential Assistance Needed N/A   DME Ordered? No   Potential Assistance Purchasing Medications No   Type of Home Care Services None   Patient expects to be discharged to: House   Follow Up Appointment: Best Day/Time  Monday AM   One/Two Story Residence One story   History of falls? 0   Services At/After  Discharge   Transition of Care Consult (CM Consult) N/A   Services At/After Discharge None    Resource Information Provided? No   Mode of Transport at Discharge Other (see comment)  (Family to transport)   Hospital Transport Time of Discharge   (to be determined on day of discharge)   Confirm Follow Up Transport Family   Condition of Participation: Discharge Planning   The Plan for Transition of Care is related to the following treatment goals: Disposition is home. Family to transport.   The Patient and/or Patient Representative was provided with a Choice of Provider? Patient   The Patient and/Or Patient Representative agree with the Discharge Plan? Yes   Freedom of Choice list was provided with basic dialogue that supports the patient's individualized plan of care/goals, treatment preferences, and shares the quality data associated with the providers?  Yes     Judith SHAFER, RN   Case Management   [phone]

## 2024-11-12 NOTE — PLAN OF CARE
Problem: Chronic Conditions and Co-morbidities  Goal: Patient's chronic conditions and co-morbidity symptoms are monitored and maintained or improved  Outcome: Progressing     Problem: Pain  Goal: Verbalizes/displays adequate comfort level or baseline comfort level  11/12/2024 0540 by Ria Rondon RN  Outcome: Progressing  11/12/2024 0536 by Ria Rondon RN  Flowsheets (Taken 11/9/2024 0839 by Jai Luna RN)  Verbalizes/displays adequate comfort level or baseline comfort level: Encourage patient to monitor pain and request assistance     Problem: Safety - Adult  Goal: Free from fall injury  Outcome: Progressing

## 2024-11-12 NOTE — PROGRESS NOTES
4 Eyes Skin Assessment     NAME:  Henrry Martinez  YOB: 1960  MEDICAL RECORD NUMBER:  [id number]    The patient is being assessed for  Shift Handoff    I agree that at least one RN has performed a thorough Head to Toe Skin Assessment on the patient. ALL assessment sites listed below have been assessed.      Areas assessed by both nurses:    Head, Face, Ears, Shoulders, Back, Chest, Arms, Elbows, Hands, Sacrum. Buttock, Coccyx, Ischium, and Legs. Feet and Heels        Does the Patient have a Wound? No noted wound(s)       Prasanna Prevention initiated by RN: No  Wound Care Orders initiated by RN: No    Pressure Injury (Stage 3,4, Unstageable, DTI, NWPT, and Complex wounds) if present, place Wound referral order by RN under : No    New Ostomies, if present place, Ostomy referral order under : No     Nurse 1 eSignature: Electronically signed by Ria Rondon RN on 11/12/24 at 7:23 AM EST    **SHARE this note so that the co-signing nurse can place an eSignature**    Nurse 2 eSignature: {Esignature:[id number]}

## 2024-11-12 NOTE — PROGRESS NOTES
[phone]    Gastroenterology follow up-Progress note    Impression:  Pt presented to ED 11/8/24 with c/o NVD x4 days with ABD pain. PMH: LE cellulitis with recent abx, Sxs started after eating fried chicken.    CT revealed severe pancolitis with an incidental finding of mild hepatosplemnomegaly. Stool sample C. Diff +, started Dificid over the weekend empirically, today, stool test was +C. Diff.    Diet was advanced to full liquid yesterday, pt still having multiple BMs after PO intake, and ABD pain persists, unchanged. WBC/Cr/Albumin stable.    Plan:  Defer colonoscopy at this time to outpatient. Colonoscopy during active infection poses a higher than average risk, and will not contribute new, actionable information. Focus should be on abx and sxs management.     - Agree with ABX Dificid for C. Diff  - Agree with antiemetics prn, consider pretreatment prior to meals  - Pain management per primary team  - Needs outpatient colonoscopy once colitis has resolved as follow up and he was due for 10yr colonoscopy CRCS recall 7/2024. Dr. Cl Angelo at [facility] (colonoscopy 2014) or our office per pt preference      Chief Complaint: NVD, ABD pain      Subjective:  Pt seen resting in bed, NAD. One, small episode emesis NOC, well after eating supper. Persistent ABD pain.     ROS: Denies any fevers, chills, rash.       Patient Active Problem List   Diagnosis    Pancolitis (HCC)    Nausea and vomiting    Essential hypertension    Type 2 diabetes mellitus, without long-term current use of insulin (HCC)    Dystonia    Depression    Hyponatremia    Sepsis without acute organ dysfunction (HCC)    Sepsis (HCC)    C. difficile colitis         /78   Pulse 82   Temp 97.8 °F (36.6 °C) (Oral)   Resp 17   Ht 1.854 m (6' 0.99\")   Wt 95.3 kg (210 lb)   SpO2 99%   BMI 27.71 kg/m²     General Appearance: No acute distress, facial flushing  HENT: normocephalic/atraumatic  Neck: No JVD  Lungs: CTA with

## 2024-11-12 NOTE — PLAN OF CARE
Problem: Chronic Conditions and Co-morbidities  Goal: Patient's chronic conditions and co-morbidity symptoms are monitored and maintained or improved  11/11/2024 1146 by Jazz Kimble, RN  Outcome: Progressing  Flowsheets (Taken 11/11/2024 0800)  Care Plan - Patient's Chronic Conditions and Co-Morbidity Symptoms are Monitored and Maintained or Improved:   Monitor and assess patient's chronic conditions and comorbid symptoms for stability, deterioration, or improvement   Collaborate with multidisciplinary team to address chronic and comorbid conditions and prevent exacerbation or deterioration   Update acute care plan with appropriate goals if chronic or comorbid symptoms are exacerbated and prevent overall improvement and discharge     Problem: Discharge Planning  Goal: Discharge to home or other facility with appropriate resources  11/11/2024 1146 by Jazz Kimble, RN  Outcome: Progressing  Flowsheets (Taken 11/11/2024 0800)  Discharge to home or other facility with appropriate resources:   Identify barriers to discharge with patient and caregiver   Arrange for needed discharge resources and transportation as appropriate   Identify discharge learning needs (meds, wound care, etc)     Problem: Pain  Goal: Verbalizes/displays adequate comfort level or baseline comfort level  11/11/2024 1146 by Jazz Kimble, RN  Outcome: Progressing     Problem: Safety - Adult  Goal: Free from fall injury  11/11/2024 1146 by aJzz Kimble, RN  Outcome: Progressing     Problem: Skin/Tissue Integrity - Adult  Goal: Skin integrity remains intact  11/11/2024 1146 by Jazz Kimble, RN  Outcome: Progressing  Flowsheets  Taken 11/11/2024 1146  Skin Integrity Remains Intact: Monitor for areas of redness and/or skin breakdown  Taken 11/11/2024 0800  Skin Integrity Remains Intact: Monitor for areas of redness and/or skin breakdown     Problem: Gastrointestinal - Adult  Goal: Minimal or absence of nausea and vomiting  11/11/2024 1146 by Jazz Kimble,

## 2024-11-13 LAB
ALBUMIN SERPL-MCNC: 2.2 G/DL (ref 3.4–5)
ALBUMIN/GLOB SERPL: 0.8 (ref 0.8–1.7)
ALP SERPL-CCNC: 56 U/L (ref 45–117)
ALT SERPL-CCNC: 13 U/L (ref 16–61)
ANION GAP SERPL CALC-SCNC: 5 MMOL/L (ref 3–18)
AST SERPL-CCNC: 10 U/L (ref 10–38)
BASOPHILS # BLD: 0.1 K/UL (ref 0–0.1)
BASOPHILS NFR BLD: 1 % (ref 0–2)
BILIRUB SERPL-MCNC: 0.5 MG/DL (ref 0.2–1)
BUN SERPL-MCNC: 12 MG/DL (ref 7–18)
BUN/CREAT SERPL: 20 (ref 12–20)
CALCIUM SERPL-MCNC: 8 MG/DL (ref 8.5–10.1)
CHLORIDE SERPL-SCNC: 109 MMOL/L (ref 100–111)
CO2 SERPL-SCNC: 24 MMOL/L (ref 21–32)
CREAT SERPL-MCNC: 0.59 MG/DL (ref 0.6–1.3)
DIFFERENTIAL METHOD BLD: ABNORMAL
EOSINOPHIL # BLD: 0.2 K/UL (ref 0–0.4)
EOSINOPHIL NFR BLD: 3 % (ref 0–5)
ERYTHROCYTE [DISTWIDTH] IN BLOOD BY AUTOMATED COUNT: 12.9 % (ref 11.6–14.5)
GLOBULIN SER CALC-MCNC: 2.8 G/DL (ref 2–4)
GLUCOSE BLD STRIP.AUTO-MCNC: 165 MG/DL (ref 70–110)
GLUCOSE BLD STRIP.AUTO-MCNC: 208 MG/DL (ref 70–110)
GLUCOSE BLD STRIP.AUTO-MCNC: 223 MG/DL (ref 70–110)
GLUCOSE SERPL-MCNC: 148 MG/DL (ref 74–99)
HCT VFR BLD AUTO: 44.8 % (ref 36–48)
HGB BLD-MCNC: 15.5 G/DL (ref 13–16)
IMM GRANULOCYTES # BLD AUTO: 0.2 K/UL (ref 0–0.04)
IMM GRANULOCYTES NFR BLD AUTO: 4 % (ref 0–0.5)
LYMPHOCYTES # BLD: 2.1 K/UL (ref 0.9–3.6)
LYMPHOCYTES NFR BLD: 31 % (ref 21–52)
MAGNESIUM SERPL-MCNC: 1.9 MG/DL (ref 1.6–2.6)
MCH RBC QN AUTO: 30 PG (ref 24–34)
MCHC RBC AUTO-ENTMCNC: 34.6 G/DL (ref 31–37)
MCV RBC AUTO: 86.8 FL (ref 78–100)
MONOCYTES # BLD: 0.5 K/UL (ref 0.05–1.2)
MONOCYTES NFR BLD: 7 % (ref 3–10)
NEUTS SEG # BLD: 3.7 K/UL (ref 1.8–8)
NEUTS SEG NFR BLD: 54 % (ref 40–73)
NRBC # BLD: 0 K/UL (ref 0–0.01)
NRBC BLD-RTO: 0 PER 100 WBC
PLATELET # BLD AUTO: 174 K/UL (ref 135–420)
PMV BLD AUTO: 9.6 FL (ref 9.2–11.8)
POTASSIUM SERPL-SCNC: 3.2 MMOL/L (ref 3.5–5.5)
PROT SERPL-MCNC: 5 G/DL (ref 6.4–8.2)
RBC # BLD AUTO: 5.16 M/UL (ref 4.35–5.65)
SODIUM SERPL-SCNC: 138 MMOL/L (ref 136–145)
WBC # BLD AUTO: 6.7 K/UL (ref 4.6–13.2)

## 2024-11-13 PROCEDURE — 6370000000 HC RX 637 (ALT 250 FOR IP): Performed by: STUDENT IN AN ORGANIZED HEALTH CARE EDUCATION/TRAINING PROGRAM

## 2024-11-13 PROCEDURE — 6360000002 HC RX W HCPCS: Performed by: INTERNAL MEDICINE

## 2024-11-13 PROCEDURE — 85025 COMPLETE CBC W/AUTO DIFF WBC: CPT

## 2024-11-13 PROCEDURE — 6370000000 HC RX 637 (ALT 250 FOR IP): Performed by: INTERNAL MEDICINE

## 2024-11-13 PROCEDURE — 1100000000 HC RM PRIVATE

## 2024-11-13 PROCEDURE — 36415 COLL VENOUS BLD VENIPUNCTURE: CPT

## 2024-11-13 PROCEDURE — 80053 COMPREHEN METABOLIC PANEL: CPT

## 2024-11-13 PROCEDURE — 2580000003 HC RX 258: Performed by: INTERNAL MEDICINE

## 2024-11-13 PROCEDURE — 99232 SBSQ HOSP IP/OBS MODERATE 35: CPT | Performed by: STUDENT IN AN ORGANIZED HEALTH CARE EDUCATION/TRAINING PROGRAM

## 2024-11-13 PROCEDURE — 97110 THERAPEUTIC EXERCISES: CPT

## 2024-11-13 PROCEDURE — 83735 ASSAY OF MAGNESIUM: CPT

## 2024-11-13 PROCEDURE — 94761 N-INVAS EAR/PLS OXIMETRY MLT: CPT

## 2024-11-13 PROCEDURE — 82962 GLUCOSE BLOOD TEST: CPT

## 2024-11-13 RX ORDER — INSULIN GLARGINE 100 [IU]/ML
32 INJECTION, SOLUTION SUBCUTANEOUS NIGHTLY
Status: DISCONTINUED | OUTPATIENT
Start: 2024-11-13 | End: 2024-11-15

## 2024-11-13 RX ADMIN — INSULIN LISPRO 10 UNITS: 100 INJECTION, SOLUTION INTRAVENOUS; SUBCUTANEOUS at 09:00

## 2024-11-13 RX ADMIN — INSULIN LISPRO 2 UNITS: 100 INJECTION, SOLUTION INTRAVENOUS; SUBCUTANEOUS at 16:45

## 2024-11-13 RX ADMIN — INSULIN LISPRO 10 UNITS: 100 INJECTION, SOLUTION INTRAVENOUS; SUBCUTANEOUS at 12:03

## 2024-11-13 RX ADMIN — FIDAXOMICIN 200 MG: 200 TABLET, FILM COATED ORAL at 21:07

## 2024-11-13 RX ADMIN — SODIUM CHLORIDE, PRESERVATIVE FREE 10 ML: 5 INJECTION INTRAVENOUS at 21:08

## 2024-11-13 RX ADMIN — INSULIN LISPRO 10 UNITS: 100 INJECTION, SOLUTION INTRAVENOUS; SUBCUTANEOUS at 16:44

## 2024-11-13 RX ADMIN — FIDAXOMICIN 200 MG: 200 TABLET, FILM COATED ORAL at 08:58

## 2024-11-13 RX ADMIN — INSULIN LISPRO 2 UNITS: 100 INJECTION, SOLUTION INTRAVENOUS; SUBCUTANEOUS at 12:04

## 2024-11-13 RX ADMIN — ACETAMINOPHEN 325MG 650 MG: 325 TABLET ORAL at 10:28

## 2024-11-13 RX ADMIN — LISINOPRIL 5 MG: 5 TABLET ORAL at 08:58

## 2024-11-13 RX ADMIN — SODIUM CHLORIDE, PRESERVATIVE FREE 10 ML: 5 INJECTION INTRAVENOUS at 09:01

## 2024-11-13 RX ADMIN — POTASSIUM CHLORIDE 40 MEQ: 1500 TABLET, EXTENDED RELEASE ORAL at 10:24

## 2024-11-13 RX ADMIN — SERTRALINE HYDROCHLORIDE 200 MG: 50 TABLET ORAL at 08:58

## 2024-11-13 RX ADMIN — INSULIN GLARGINE 32 UNITS: 100 INJECTION, SOLUTION SUBCUTANEOUS at 21:08

## 2024-11-13 RX ADMIN — ENOXAPARIN SODIUM 40 MG: 100 INJECTION SUBCUTANEOUS at 16:50

## 2024-11-13 ASSESSMENT — PAIN SCALES - GENERAL
PAINLEVEL_OUTOF10: 3
PAINLEVEL_OUTOF10: 0
PAINLEVEL_OUTOF10: 2

## 2024-11-13 ASSESSMENT — PAIN DESCRIPTION - ORIENTATION: ORIENTATION: MID;ANTERIOR

## 2024-11-13 ASSESSMENT — PAIN - FUNCTIONAL ASSESSMENT: PAIN_FUNCTIONAL_ASSESSMENT: ACTIVITIES ARE NOT PREVENTED

## 2024-11-13 ASSESSMENT — PAIN DESCRIPTION - FREQUENCY: FREQUENCY: CONTINUOUS

## 2024-11-13 ASSESSMENT — PAIN DESCRIPTION - PAIN TYPE: TYPE: ACUTE PAIN

## 2024-11-13 ASSESSMENT — PAIN DESCRIPTION - ONSET: ONSET: GRADUAL

## 2024-11-13 ASSESSMENT — PAIN DESCRIPTION - LOCATION: LOCATION: HEAD

## 2024-11-13 ASSESSMENT — PAIN DESCRIPTION - DESCRIPTORS: DESCRIPTORS: DISCOMFORT

## 2024-11-13 NOTE — PLAN OF CARE
Problem: Chronic Conditions and Co-morbidities  Goal: Patient's chronic conditions and co-morbidity symptoms are monitored and maintained or improved  11/12/2024 2222 by Frances Calvin RN  Outcome: Progressing  Flowsheets (Taken 11/12/2024 2000)  Care Plan - Patient's Chronic Conditions and Co-Morbidity Symptoms are Monitored and Maintained or Improved: Monitor and assess patient's chronic conditions and comorbid symptoms for stability, deterioration, or improvement  11/12/2024 2021 by Jazz Kimble RN  Outcome: Progressing  Flowsheets (Taken 11/12/2024 2000 by Frances Calvin RN)  Care Plan - Patient's Chronic Conditions and Co-Morbidity Symptoms are Monitored and Maintained or Improved: Monitor and assess patient's chronic conditions and comorbid symptoms for stability, deterioration, or improvement  Note: Monitor pt's blood sugar and administer medication as appropriate     Problem: Discharge Planning  Goal: Discharge to home or other facility with appropriate resources  11/12/2024 2222 by Frances Calvin RN  Outcome: Progressing  Flowsheets (Taken 11/12/2024 2000)  Discharge to home or other facility with appropriate resources: Identify barriers to discharge with patient and caregiver  11/12/2024 2021 by Jazz Kimble RN  Outcome: Progressing  Flowsheets (Taken 11/12/2024 2000 by Frances Calvin RN)  Discharge to home or other facility with appropriate resources: Identify barriers to discharge with patient and caregiver     Problem: Pain  Goal: Verbalizes/displays adequate comfort level or baseline comfort level  11/12/2024 2222 by Frances Calvin RN  Outcome: Progressing  Note: Patient to utilize the Number pain scale to report pain and relief.   11/12/2024 2021 by Jazz Kimble RN  Outcome: Progressing  Note: Monitor pt's pain and provide appropriate analgesic     Problem: Safety - Adult  Goal: Free from fall injury  Outcome: Progressing  Flowsheets (Taken 11/12/2024 2222)  Free From Fall Injury: Instruct  returns to baseline bowel function: Assess bowel function  Note: Monitor number of stools  Goal: Maintains adequate nutritional intake  11/12/2024 2222 by Frances Calvin RN  Outcome: Progressing  Flowsheets (Taken 11/12/2024 2222)  Maintains adequate nutritional intake: Identify factors contributing to decreased intake, treat as appropriate  Note: Will continue to monitor for any nausea and administer IV fluids as ordered.  11/12/2024 2021 by Jazz Kimble RN  Flowsheets (Taken 11/12/2024 2000 by Frances Calvin RN)  Maintains adequate nutritional intake: Monitor percentage of each meal consumed  Note: Provide antianemic prior to meals to prevent nausea and allow for adequate intake      Problem: Metabolic/Fluid and Electrolytes - Adult  Goal: Electrolytes maintained within normal limits  11/12/2024 2222 by Frances Calvin RN  Outcome: Progressing  Note: Monitor lab values.    11/12/2024 2021 by Jazz Kimble RN  Outcome: Progressing  Flowsheets (Taken 11/12/2024 2000 by Frances Calvin RN)  Electrolytes maintained within normal limits: Monitor labs and assess patient for signs and symptoms of electrolyte imbalances  Note: Provide potassium replacement and monitor effectiveness  Goal: Glucose maintained within prescribed range  11/12/2024 2222 by Frances Calvin RN  Outcome: Progressing  Note: Monitor blood glucose levels AC/HS.  11/12/2024 2021 by Jazz Kimble RN  Flowsheets (Taken 11/12/2024 2000 by Frances Calvin RN)  Glucose maintained within prescribed range: Monitor blood glucose as ordered  Note: Monitor blood sugar and administer medications as appropriate     Problem: Infection - Adult  Goal: Absence of infection at discharge  11/12/2024 2222 by Frances Calvin RN  Outcome: Progressing  Flowsheets (Taken 11/12/2024 2222)  Absence of infection at discharge:   Assess and monitor for signs and symptoms of infection   Monitor lab/diagnostic results  11/12/2024 2021 by Jazz Kimble RN  Outcome: Progressing  Flowsheets (Taken

## 2024-11-13 NOTE — PROGRESS NOTES
OT attempted 2x. Pt c/o fatigue s/p PT this am; request return after lunch.    2nd attempt. Pt seated on BSC, refusing OT 2/2 fatigue from excess BMs.    Will continue to follow.

## 2024-11-13 NOTE — PROGRESS NOTES
4 Eyes Skin Assessment     NAME:  Henrry Martinez  YOB: 1960  MEDICAL RECORD NUMBER:  [id number]    The patient is being assessed for  {Reason for Assessment:39022}    I agree that at least one RN has performed a thorough Head to Toe Skin Assessment on the patient. ALL assessment sites listed below have been assessed.      Areas assessed by both nurses:    {Pressure Areas Assessed:40942}        Does the Patient have a Wound? {Action Wound:72702}       Prasanna Prevention initiated by RN: No  Wound Care Orders initiated by RN: No    Pressure Injury (Stage 3,4, Unstageable, DTI, NWPT, and Complex wounds) if present, place Wound referral order by RN under : No    New Ostomies, if present place, Ostomy referral order under : No     Nurse 1 eSignature: Electronically signed by Frances Calvin RN on 11/13/24 at 7:41 AM EST    **SHARE this note so that the co-signing nurse can place an eSignature**    Nurse 2 eSignature: {Esignature:[id number]}

## 2024-11-13 NOTE — PROGRESS NOTES
[facility]  Progress Note    Patient: Henrry Martinez Age: 64 y.o. : 1960 MR#: [id number] SSN: [id number]  Date: 2024     DVT Prophylaxis:  [x]Lovenox  []Hep SQ  []SCDs  []Coumadin   []On Heparin gtt []PO anticoagulant    Anticipated discharge: 2024 to home, pending clinical course and improvement of diarrhea    Subjective:     The patient was seen and examined at the bedside.   His abdominal pain has greatly improved but he continues to have profuse diarrhea.  Patient reports improvement of his abdominal pain however still having 4 bouts of diarrhea improved from 10 previously.  Discussed need for improvement of diarrhea prior to eventual discharge home, encouraged oral intake.  Questions were answered to the best my ability    Patient requested that I do not update family today.  Objective:   VS: BP (!) 148/76   Pulse 82   Temp 97.9 °F (36.6 °C) (Oral)   Resp 18   Ht 1.854 m (6' 0.99\")   Wt 95.3 kg (210 lb)   SpO2 97%   BMI 27.71 kg/m²    Tmax/24hrs: Temp (24hrs), Av.1 °F (36.7 °C), Min:97.7 °F (36.5 °C), Max:98.4 °F (36.9 °C)  No intake or output data in the 24 hours ending 24 1454    PHYSICAL EXAM  General Appearance: No acute distress  HENT: normocephalic/atraumatic, moist mucus membranes  Neck: No JVD, supple  Lungs: CTA with normal respiratory effort  CV: RRR, no m/r/g  Abdomen: soft, mildly tender in epigastrium and left lower quadrant but nondistended; bowel sounds slightly diminished; no organomegaly appreciated  : No Mason catheter; no suprapubic tenderness , no CVA tenderness  Extremities: no cyanosis, no peripheral edema  Neuro: No focal deficits, motor/sensory intact;   Skin: Warm and dry  Psych: appropriate mood and affect      Current Facility-Administered Medications   Medication Dose Route Frequency    insulin glargine (LANTUS) injection vial 30 Units  30 Units SubCUTAneous Nightly    insulin lispro  Granulocytes % 4 (H) 0.0 - 0.5 %    Neutrophils Absolute 3.7 1.8 - 8.0 K/UL    Lymphocytes Absolute 2.1 0.9 - 3.6 K/UL    Monocytes Absolute 0.5 0.05 - 1.2 K/UL    Eosinophils Absolute 0.2 0.0 - 0.4 K/UL    Basophils Absolute 0.1 0.0 - 0.1 K/UL    Immature Granulocytes Absolute 0.2 (H) 0.00 - 0.04 K/UL    Differential Type AUTOMATED     Magnesium    Collection Time: 11/13/24  4:50 AM   Result Value Ref Range    Magnesium 1.9 1.6 - 2.6 mg/dL   POCT Glucose    Collection Time: 11/13/24  6:37 AM   Result Value Ref Range    POC Glucose 165 (H) 70 - 110 mg/dL   POCT Glucose    Collection Time: 11/13/24 11:58 AM   Result Value Ref Range    POC Glucose 208 (H) 70 - 110 mg/dL       Imaging:  CT ABDOMEN PELVIS W IV CONTRAST Additional Contrast? None    Result Date: 11/8/2024  EXAM: CT ABDOMEN AND PELVIS WITH CONTRAST CLINICAL INDICATION/HISTORY: diffuse abd pain. Vomiting and diarrhea for 5 days. Nausea. Abdominal pain. COMPARISON: CT abdomen/pelvis 1/26/2024 TECHNIQUE:  CT abdomen and pelvis with IV contrast. All CT scans at this facility are performed using dose optimization technique as appropriate to the performed examination, to include automated exposure control, adjustment of the mA and/or kV according to patient's size (including appropriate matching for site-specific examinations), or use of an iterative reconstruction technique. FINDINGS: Lower chest: Unremarkable. Liver: Mildly enlarged, measuring 19 cm in craniocaudal dimension. Biliary: Unremarkable. Pancreas: Unremarkable. Spleen: Enlarged, measuring up to 14 cm. Adrenal glands: Unremarkable. Kidneys: Unremarkable. Bladder: Unremarkable. Reproductive organs: Unremarkable. Bowel: Diffuse colon wall thickening, with surrounding stranding. Multiple colonic diverticula, but inflammation is not particularly centered around diverticula. No evidence of bowel obstruction. Lymph nodes: No pathologically enlarged lymph nodes. Vasculature: Mild burden of calcified

## 2024-11-13 NOTE — PLAN OF CARE
Problem: Chronic Conditions and Co-morbidities  Goal: Patient's chronic conditions and co-morbidity symptoms are monitored and maintained or improved  Outcome: Progressing  Note: Monitor pt's blood sugar and administer medication as appropriate     Problem: Pain  Goal: Verbalizes/displays adequate comfort level or baseline comfort level  Outcome: Progressing  Note: Monitor pt's pain and provide appropriate analgesic     Problem: Skin/Tissue Integrity - Adult  Goal: Skin integrity remains intact  Outcome: Progressing  Note: Monitor pt's skin for break down     Problem: Gastrointestinal - Adult  Goal: Minimal or absence of nausea and vomiting  Outcome: Progressing  Note: Provide antiemetics prior to meals to prevent nausea      Problem: Gastrointestinal - Adult  Goal: Maintains or returns to baseline bowel function  Outcome: Progressing  Note: Monitor number of stools and consistency     Problem: Metabolic/Fluid and Electrolytes - Adult  Goal: Electrolytes maintained within normal limits  Outcome: Progressing  Note: Provide potassium replacement and monitor effectiveness     Problem: Infection - Adult  Goal: Absence of infection at discharge  Outcome: Progressing  Note: Administer antibiotics, monitor for fever, monitor pt's WBCs

## 2024-11-13 NOTE — PLAN OF CARE
0/10  Scale: Numeric Rating Scale  Location: n/a    Activity Tolerance:   Activity Tolerance: Patient limited by fatigue  Please refer to the flowsheet for vital signs taken during this treatment.  After treatment:   [] Patient left in no apparent distress sitting up in chair  [x] Patient left in no apparent distress in bed  [x] Call bell left within reach  [] Nursing notified  [] Caregiver present  [] Bed alarm activated  [] SCDs applied      COMMUNICATION/EDUCATION:   Patient Education  Education Given To: Patient  Education Provided: Home Exercise Program;Plan of Care  Education Method: Demonstration;Verbal;Teach Back  Barriers to Learning: None  Education Outcome: Verbalized understanding;Demonstrated understanding      Maria Alejandra Wilkins, PT  Minutes: 8

## 2024-11-13 NOTE — PROGRESS NOTES
[phone]    Gastroenterology follow up-Progress note    Impression:  Pt presented to ED 11/8/24 with c/o NVD x4 days with ABD pain. PMH: LE cellulitis with recent abx, Sxs started after eating fried chicken.    CT revealed severe pancolitis with an incidental finding of mild hepatosplemnomegaly. Stool sample C. Diff +, started Dificid over the weekend empirically, today, stool test was +C. Diff.    Diet was advanced to regular, BMs are finally slowing down today, and ABD pain is much improved. WBC/Cr/Albumin stable.    Plan:  Defer colonoscopy at this time to outpatient. Colonoscopy during active infection poses a higher than average risk, and will not contribute new, actionable information. Focus should be on abx and sxs management.     - Educated pt on need for vigorous hand hygiene after BMs and before all PO.  - Agree with ABX Dificid for C. Diff  - Agree with antiemetics prn, consider pretreatment prior to meals  - Pain management per primary team  - Needs outpatient colonoscopy once colitis has resolved as follow up and he was due for 10yr colonoscopy CRCS recall 7/2024. Dr. Cl Angelo at [facility] (colonoscopy 2014) or our office per pt preference      Chief Complaint: NVD, ABD pain      Subjective:  Pt seen resting in bed, NAD. One, small episode emesis NOC, well after eating supper. Persistent ABD pain.     ROS: Denies any fevers, chills, rash.       Patient Active Problem List   Diagnosis    Pancolitis (HCC)    Nausea and vomiting    Essential hypertension    Type 2 diabetes mellitus, without long-term current use of insulin (HCC)    Dystonia    Depression    Hyponatremia    Sepsis without acute organ dysfunction (HCC)    Sepsis (HCC)    C. difficile colitis         /65   Pulse 86   Temp 97.7 °F (36.5 °C) (Oral)   Resp 18   Ht 1.854 m (6' 0.99\")   Wt 95.3 kg (210 lb)   SpO2 97%   BMI 27.71 kg/m²     General Appearance: No acute distress, facial flushing  HENT:

## 2024-11-13 NOTE — DISCHARGE SUMMARY
[facility]    Discharge Summary    Patient: Henrry Martinez MRN: [id number]  [facility]: [id number]    YOB: 1960  Age: 64 y.o.  Sex: male    DOA: 11/8/2024 LOS:  LOS: 6 days   Discharge Date:      Admission Diagnoses: Dehydration [E86.0]  Colitis [K52.9]  Pancolitis (HCC) [K51.00]  Diarrhea, unspecified type [R19.7]  Hyperglycemia due to diabetes mellitus (HCC) [E11.65]  Sepsis (HCC) [A41.9]    Discharge Diagnoses:    Hospital Problems             Last Modified POA    Pancolitis (HCC) 11/8/2024 Yes    Nausea and vomiting 11/8/2024 Yes    Essential hypertension 11/9/2024 Yes    Type 2 diabetes mellitus, without long-term current use of insulin (HCC) (Chronic) 11/8/2024 Yes    Dystonia (Chronic) 11/8/2024 Yes    Depression (Chronic) 11/8/2024 Yes    Hyponatremia 11/9/2024 Yes    Sepsis without acute organ dysfunction (HCC) 11/9/2024 Yes    Sepsis (HCC) 11/10/2024 Yes    C. difficile colitis 11/11/2024 Yes     Discharge Condition: Stable    Discharge To: Home    Consults: Gastroenterology    HPI:    PER ADMISSION HPI  64 y.o. male who presents to [facility] ([facility] ER with complaint of Vomiting and Diarrhea.  Patient reports that he has been having nausea, vomiting, and diarrhea since Monday (11/04/2024).  Patient reports that he was having >30 BMs/day when he was freely drinking fluids orally.  Patient states that he was treated for a Left Lower Extremity Cellulitis on 10/05/2024 and completed a course of oral antibiotics that were prescribed at that time [Sulfamethoxazole-Trimethoprim (a.k.a. Bactrim, a.k.a. SMX-TMP)].  Patient reports that he started having chills on Tuesday (11/05/2024).  Patient reports that he last had a BM today, but that may be because he stopped his oral intake.  Patient reports hat he does not have a history of Inflammatory Bowel Disease or Colitis.  Patient verifies his Home Medication List.  Patient denies fevers,  1:22 AM   Result Value Ref Range    Sodium 137 136 - 145 mmol/L    Potassium 3.4 (L) 3.5 - 5.5 mmol/L    Chloride 107 100 - 111 mmol/L    CO2 26 21 - 32 mmol/L    Anion Gap 4 3.0 - 18 mmol/L    Glucose 282 (H) 74 - 99 mg/dL    BUN 10 7.0 - 18 MG/DL    Creatinine 0.50 (L) 0.6 - 1.3 MG/DL    BUN/Creatinine Ratio 20 12 - 20      Est, Glom Filt Rate >90 >60 ml/min/1.73m2    Calcium 8.0 (L) 8.5 - 10.1 MG/DL   POCT Glucose    Collection Time: 11/16/24  6:49 AM   Result Value Ref Range    POC Glucose 243 (H) 70 - 110 mg/dL   POCT Glucose    Collection Time: 11/16/24 11:47 AM   Result Value Ref Range    POC Glucose 181 (H) 70 - 110 mg/dL     CT ABDOMEN PELVIS W IV CONTRAST Additional Contrast? None    Result Date: 11/8/2024  EXAM: CT ABDOMEN AND PELVIS WITH CONTRAST CLINICAL INDICATION/HISTORY: diffuse abd pain. Vomiting and diarrhea for 5 days. Nausea. Abdominal pain. COMPARISON: CT abdomen/pelvis 1/26/2024 TECHNIQUE:  CT abdomen and pelvis with IV contrast. All CT scans at this facility are performed using dose optimization technique as appropriate to the performed examination, to include automated exposure control, adjustment of the mA and/or kV according to patient's size (including appropriate matching for site-specific examinations), or use of an iterative reconstruction technique. FINDINGS: Lower chest: Unremarkable. Liver: Mildly enlarged, measuring 19 cm in craniocaudal dimension. Biliary: Unremarkable. Pancreas: Unremarkable. Spleen: Enlarged, measuring up to 14 cm. Adrenal glands: Unremarkable. Kidneys: Unremarkable. Bladder: Unremarkable. Reproductive organs: Unremarkable. Bowel: Diffuse colon wall thickening, with surrounding stranding. Multiple colonic diverticula, but inflammation is not particularly centered around diverticula. No evidence of bowel obstruction. Lymph nodes: No pathologically enlarged lymph nodes. Vasculature: Mild burden of calcified atherosclerotic disease. Other: Trace free fluid. Body

## 2024-11-13 NOTE — PLAN OF CARE
Problem: Pain  Goal: Verbalizes/displays adequate comfort level or baseline comfort level  11/13/2024 1127 by Meet Cabezas, RN  Outcome: Progressing  Flowsheets (Taken 11/13/2024 1127)  Verbalizes/displays adequate comfort level or baseline comfort level:   Encourage patient to monitor pain and request assistance   Assess pain using appropriate pain scale  11/13/2024 1125 by Meet Cabezas, RN       Problem: Metabolic/Fluid and Electrolytes - Adult  Goal: Electrolytes maintained within normal limits  11/13/2024 1127 by Meet Cabezas, RN  Outcome: Progressing  Flowsheets (Taken 11/13/2024 1127)  Electrolytes maintained within normal limits:   Monitor labs and assess patient for signs and symptoms of electrolyte imbalances   Administer electrolyte replacement as ordered  Note: Potassium replacement given as ordered.  11/13/2024 1125 by Meet Cabezas, RN  Outcome: Progressing  Flowsheets (Taken 11/13/2024 1125)  Electrolytes maintained within normal limits:   Monitor labs and assess patient for signs and symptoms of electrolyte imbalances   Administer electrolyte replacement as ordered  Note: Potassium replacement given as ordered.

## 2024-11-14 LAB
ANION GAP SERPL CALC-SCNC: 4 MMOL/L (ref 3–18)
BUN SERPL-MCNC: 11 MG/DL (ref 7–18)
BUN/CREAT SERPL: 19 (ref 12–20)
CALCIUM SERPL-MCNC: 8 MG/DL (ref 8.5–10.1)
CHLORIDE SERPL-SCNC: 108 MMOL/L (ref 100–111)
CO2 SERPL-SCNC: 24 MMOL/L (ref 21–32)
CREAT SERPL-MCNC: 0.57 MG/DL (ref 0.6–1.3)
GLUCOSE BLD STRIP.AUTO-MCNC: 141 MG/DL (ref 70–110)
GLUCOSE BLD STRIP.AUTO-MCNC: 151 MG/DL (ref 70–110)
GLUCOSE BLD STRIP.AUTO-MCNC: 229 MG/DL (ref 70–110)
GLUCOSE BLD STRIP.AUTO-MCNC: 329 MG/DL (ref 70–110)
GLUCOSE SERPL-MCNC: 198 MG/DL (ref 74–99)
MAGNESIUM SERPL-MCNC: 2 MG/DL (ref 1.6–2.6)
O+P SPEC MICRO: NORMAL
O+P STL CONC: NORMAL
POTASSIUM SERPL-SCNC: 3.3 MMOL/L (ref 3.5–5.5)
SODIUM SERPL-SCNC: 136 MMOL/L (ref 136–145)
SPECIMEN SOURCE: NORMAL

## 2024-11-14 PROCEDURE — 6370000000 HC RX 637 (ALT 250 FOR IP): Performed by: STUDENT IN AN ORGANIZED HEALTH CARE EDUCATION/TRAINING PROGRAM

## 2024-11-14 PROCEDURE — 36415 COLL VENOUS BLD VENIPUNCTURE: CPT

## 2024-11-14 PROCEDURE — 97535 SELF CARE MNGMENT TRAINING: CPT

## 2024-11-14 PROCEDURE — 82962 GLUCOSE BLOOD TEST: CPT

## 2024-11-14 PROCEDURE — 6370000000 HC RX 637 (ALT 250 FOR IP): Performed by: INTERNAL MEDICINE

## 2024-11-14 PROCEDURE — 94761 N-INVAS EAR/PLS OXIMETRY MLT: CPT

## 2024-11-14 PROCEDURE — 80048 BASIC METABOLIC PNL TOTAL CA: CPT

## 2024-11-14 PROCEDURE — 6360000002 HC RX W HCPCS: Performed by: INTERNAL MEDICINE

## 2024-11-14 PROCEDURE — 2580000003 HC RX 258: Performed by: INTERNAL MEDICINE

## 2024-11-14 PROCEDURE — 99232 SBSQ HOSP IP/OBS MODERATE 35: CPT | Performed by: STUDENT IN AN ORGANIZED HEALTH CARE EDUCATION/TRAINING PROGRAM

## 2024-11-14 PROCEDURE — 83735 ASSAY OF MAGNESIUM: CPT

## 2024-11-14 PROCEDURE — 1100000000 HC RM PRIVATE

## 2024-11-14 RX ADMIN — SERTRALINE HYDROCHLORIDE 200 MG: 50 TABLET ORAL at 08:32

## 2024-11-14 RX ADMIN — FIDAXOMICIN 200 MG: 200 TABLET, FILM COATED ORAL at 08:32

## 2024-11-14 RX ADMIN — LISINOPRIL 5 MG: 5 TABLET ORAL at 08:32

## 2024-11-14 RX ADMIN — ACETAMINOPHEN 325MG 650 MG: 325 TABLET ORAL at 21:31

## 2024-11-14 RX ADMIN — INSULIN LISPRO 2 UNITS: 100 INJECTION, SOLUTION INTRAVENOUS; SUBCUTANEOUS at 16:49

## 2024-11-14 RX ADMIN — ACETAMINOPHEN 325MG 650 MG: 325 TABLET ORAL at 15:23

## 2024-11-14 RX ADMIN — SODIUM CHLORIDE, PRESERVATIVE FREE 10 ML: 5 INJECTION INTRAVENOUS at 21:27

## 2024-11-14 RX ADMIN — FIDAXOMICIN 200 MG: 200 TABLET, FILM COATED ORAL at 21:28

## 2024-11-14 RX ADMIN — INSULIN GLARGINE 32 UNITS: 100 INJECTION, SOLUTION SUBCUTANEOUS at 21:27

## 2024-11-14 RX ADMIN — POTASSIUM CHLORIDE 40 MEQ: 1500 TABLET, EXTENDED RELEASE ORAL at 08:32

## 2024-11-14 RX ADMIN — INSULIN LISPRO 10 UNITS: 100 INJECTION, SOLUTION INTRAVENOUS; SUBCUTANEOUS at 11:39

## 2024-11-14 RX ADMIN — INSULIN LISPRO 10 UNITS: 100 INJECTION, SOLUTION INTRAVENOUS; SUBCUTANEOUS at 08:34

## 2024-11-14 RX ADMIN — INSULIN LISPRO 6 UNITS: 100 INJECTION, SOLUTION INTRAVENOUS; SUBCUTANEOUS at 21:28

## 2024-11-14 RX ADMIN — Medication 5 MG: at 21:31

## 2024-11-14 RX ADMIN — INSULIN LISPRO 10 UNITS: 100 INJECTION, SOLUTION INTRAVENOUS; SUBCUTANEOUS at 16:48

## 2024-11-14 RX ADMIN — SODIUM CHLORIDE, PRESERVATIVE FREE 10 ML: 5 INJECTION INTRAVENOUS at 08:35

## 2024-11-14 RX ADMIN — ENOXAPARIN SODIUM 40 MG: 100 INJECTION SUBCUTANEOUS at 16:49

## 2024-11-14 ASSESSMENT — PAIN SCALES - GENERAL
PAINLEVEL_OUTOF10: 0
PAINLEVEL_OUTOF10: 0
PAINLEVEL_OUTOF10: 6
PAINLEVEL_OUTOF10: 0
PAINLEVEL_OUTOF10: 3

## 2024-11-14 ASSESSMENT — PAIN DESCRIPTION - PAIN TYPE
TYPE: ACUTE PAIN
TYPE: ACUTE PAIN

## 2024-11-14 ASSESSMENT — PAIN DESCRIPTION - ONSET
ONSET: GRADUAL
ONSET: GRADUAL

## 2024-11-14 ASSESSMENT — PAIN DESCRIPTION - ORIENTATION
ORIENTATION: ANTERIOR;MID
ORIENTATION: MID

## 2024-11-14 ASSESSMENT — PAIN DESCRIPTION - LOCATION
LOCATION: HEAD
LOCATION: HEAD

## 2024-11-14 ASSESSMENT — PAIN DESCRIPTION - DIRECTION
RADIATING_TOWARDS: NO
RADIATING_TOWARDS: NO

## 2024-11-14 ASSESSMENT — PAIN DESCRIPTION - DESCRIPTORS
DESCRIPTORS: ACHING
DESCRIPTORS: DISCOMFORT

## 2024-11-14 ASSESSMENT — PAIN DESCRIPTION - FREQUENCY
FREQUENCY: INTERMITTENT
FREQUENCY: INTERMITTENT

## 2024-11-14 NOTE — PROGRESS NOTES
4 Eyes Skin Assessment     NAME:  Henrry Martinez  YOB: 1960  MEDICAL RECORD NUMBER:  [id number]    The patient is being assessed for  Shift Handoff    I agree that at least one RN has performed a thorough Head to Toe Skin Assessment on the patient. ALL assessment sites listed below have been assessed.      Areas assessed by both nurses:    Head, Face, Ears, Shoulders, Back, Chest, Arms, Elbows, Hands, Sacrum. Buttock, Coccyx, Ischium, Legs. Feet and Heels, and Under Medical Devices         Does the Patient have a Wound? No noted wound(s)       Prasanna Prevention initiated by RN: No  Wound Care Orders initiated by RN: No    Pressure Injury (Stage 3,4, Unstageable, DTI, NWPT, and Complex wounds) if present, place Wound referral order by RN under : No    New Ostomies, if present place, Ostomy referral order under : No     Nurse 1 eSignature: Electronically signed by PAULINE ARGUETA RN on 11/13/24 at 7:28 PM EST    **SHARE this note so that the co-signing nurse can place an eSignature**    Nurse 2 eSignature: Electronically signed by Frances Calvin RN on 11/13/24 at 10:37 PM EST

## 2024-11-14 NOTE — PLAN OF CARE
Problem: Chronic Conditions and Co-morbidities  Goal: Patient's chronic conditions and co-morbidity symptoms are monitored and maintained or improved  Outcome: Progressing  Flowsheets (Taken 11/13/2024 2226)  Care Plan - Patient's Chronic Conditions and Co-Morbidity Symptoms are Monitored and Maintained or Improved: Monitor and assess patient's chronic conditions and comorbid symptoms for stability, deterioration, or improvement     Problem: Discharge Planning  Goal: Discharge to home or other facility with appropriate resources  Outcome: Progressing  Flowsheets (Taken 11/13/2024 2226)  Discharge to home or other facility with appropriate resources: Identify barriers to discharge with patient and caregiver     Problem: Pain  Goal: Verbalizes/displays adequate comfort level or baseline comfort level  11/13/2024 2226 by Frances Calvin RN  Outcome: Progressing  Flowsheets (Taken 11/13/2024 2226)  Verbalizes/displays adequate comfort level or baseline comfort level: Assess pain using appropriate pain scale  Note: Encouraged patient to report pain and acceptable comfort levels using the Number Pain scale.    11/13/2024 1127 by Meet Cabezas, RN  Outcome: Progressing  Flowsheets (Taken 11/13/2024 1127)  Verbalizes/displays adequate comfort level or baseline comfort level:   Encourage patient to monitor pain and request assistance   Assess pain using appropriate pain scale  11/13/2024 1125 by Meet Cabezas, RN  Outcome: Progressing  Flowsheets (Taken 11/13/2024 1125)  Verbalizes/displays adequate comfort level or baseline comfort level:   Encourage patient to monitor pain and request assistance   Assess pain using appropriate pain scale     Problem: Physical Therapy - Adult  Goal: By Discharge: Performs mobility at highest level of function for planned discharge setting.  See evaluation for individualized goals.  Description: Physical Therapy Goals:  Initiated 11/9/2024 to be met within 7-10 days.    1.  Patient will

## 2024-11-14 NOTE — PLAN OF CARE
Problem: Occupational Therapy - Adult  Goal: By Discharge: Performs self-care activities at highest level of function for planned discharge setting.  See evaluation for individualized goals.  Description: Occupational Therapy Goals:  Initiated 11/10/2024 to be met within 7-10 days.      1.  Patient will perform grooming with modified independence while standing at the sink for > 5 min with Good balance.   2.  Patient will perform bathing with modified independence.  3.  Patient will perform lower body dressing with modified independence for seated and standing aspects.  4.  Patient will perform toilet transfers with modified independence.  5.  Patient will perform all aspects of toileting with modified independence.  6.  Patient will participate in upper extremity therapeutic exercise/activities with supervision/set-up for 8 minutes to improve endurance and UB strength needed for ADLs    7.  Patient will utilize energy conservation techniques during functional activities with verbal cues.    PLOF: Pt lives alone, independent.  Outcome: Resolved/Met  OCCUPATIONAL THERAPY TREATMENT/DISCHARGE    Patient: Henrry Martinez (64 y.o. male)  Date: 11/14/2024  Diagnosis: Dehydration [E86.0]  Colitis [K52.9]  Pancolitis (HCC) [K51.00]  Diarrhea, unspecified type [R19.7]  Hyperglycemia due to diabetes mellitus (HCC) [E11.65]  Sepsis (HCC) [A41.9] Colitis      Precautions: Contact Precautions, Fall Risk    Chart, occupational therapy assessment, plan of care, and goals were reviewed.  ASSESSMENT:  Pt OOB seated in chair upon entry. Pleasant and cooperative. Demonstrates good safety and balance w/functional mobility/transfers. Tolerates standing sinkside performing ADLs ~ 5 minutes w/no LOB. No c/o pain or SOB w/activity. Reviewed energy conservation tehcnqiues w/ADLs and benefits of shower chair.        PLAN:  Maximum therapeutic gains met at current level of care and patient will be discharged from occupational therapy at  Verbal;Teach Back  Barriers to Learning: None  Education Outcome: Verbalized understanding;Demonstrated understanding    Thank you for this referral.  JOSH Blanton  Minutes: 15

## 2024-11-14 NOTE — PROGRESS NOTES
4 Eyes Skin Assessment     NAME:  Henrry Martinez  YOB: 1960  MEDICAL RECORD NUMBER:  [id number]    The patient is being assessed for  Shift Handoff    I agree that at least one RN has performed a thorough Head to Toe Skin Assessment on the patient. ALL assessment sites listed below have been assessed.      Areas assessed by both nurses:    Head, Face, Ears, Shoulders, Back, Chest, Arms, Elbows, Hands, Sacrum. Buttock, Coccyx, Ischium, Legs. Feet and Heels, Under Medical Devices , and Other na        Does the Patient have a Wound? No noted wound(s)       Prasanna Prevention initiated by RN: No  Wound Care Orders initiated by RN: No    Pressure Injury (Stage 3,4, Unstageable, DTI, NWPT, and Complex wounds) if present, place Wound referral order by RN under : No    New Ostomies, if present place, Ostomy referral order under : No     Nurse 1 eSignature: Electronically signed by Frances Calvin RN on 11/14/24 at 6:19 AM EST    **SHARE this note so that the co-signing nurse can place an eSignature**    Nurse 2 eSignature: Electronically signed by PAULINE ARGUETA RN on 11/14/24 at 5:55 PM EST

## 2024-11-14 NOTE — PLAN OF CARE
returns to baseline bowel function  Outcome: Progressing  Flowsheets (Taken 11/14/2024 1334)  Maintains or returns to baseline bowel function:   Assess bowel function   Encourage oral fluids to ensure adequate hydration   Administer ordered medications as needed     Problem: Infection - Adult  Goal: Absence of infection at discharge  Outcome: Progressing  Flowsheets (Taken 11/14/2024 1334)  Absence of infection at discharge: Assess and monitor for signs and symptoms of infection     Problem: Metabolic/Fluid and Electrolytes - Adult  Goal: Electrolytes maintained within normal limits  Outcome: Progressing  Flowsheets (Taken 11/14/2024 1316)  Electrolytes maintained within normal limits: Monitor labs and assess patient for signs and symptoms of electrolyte imbalances

## 2024-11-14 NOTE — PROGRESS NOTES
[email or web address]  [phone]    Gastroenterology follow up-Progress note    Impression:  1. Severe nausea/vomiting - vomiting resolved  2. Diarrhea  - 11/8/24 CT abd/pelvis - severe pancolitis and hepatomegaly - suggestive of infectious process, suspicious for c diff.  - C diff positive 11/10/24 - on Dificid  - O&P pending  3. Hyperglycemia   4. Hepatosplenomegaly  - needs further eval once colitis process resolved   - LFTs normal    Plan:  1. Continue Dificid and complete full course  2. Antiemetic prn  3. Medical management as per primary team  4. Needs outpatient colonoscopy once colitis has resolved as follow up and he was due for 10yr colonoscopy CRCS recall 7/2024. Dr. Cl Angelo at [facility] (colonoscopy 2014) or our office per pt preference     Chief Complaint: nausea, vomiting, abdominal pain      Subjective:  Continued lower abdominal pain, nausea but no vomiting, tolerating crackers, denies blood in stools    ROS: Denies any fevers, chills, rash.     Eyes: conjunctiva normal, EOM normal   Neck: ROM normal, supple and trachea normal   Cardiovascular: heart normal, normal rate and regular rhythm   Pulmonary/Chest Wall: breath sounds normal and effort normal   Abdominal: appearance normal, bowel sounds normal and soft, non-acute, lower abdominal tenderness     Patient Active Problem List   Diagnosis    Pancolitis (HCC)    Nausea and vomiting    Essential hypertension    Type 2 diabetes mellitus, without long-term current use of insulin (HCC)    Dystonia    Depression    Hyponatremia    Sepsis without acute organ dysfunction (HCC)    Sepsis (HCC)    C. difficile colitis         /75   Pulse 86   Temp 98 °F (36.7 °C) (Oral)   Resp 18   Ht 1.854 m (6' 0.99\")   Wt 95.3 kg (210 lb)   SpO2 99%   BMI 27.71 kg/m²       No intake or output data in the 24 hours ending 11/14/24 0936    CBC w/Diff    Lab Results   Component Value Date/Time    WBC 6.7 11/13/2024 04:50 AM    RBC 5.16

## 2024-11-14 NOTE — PLAN OF CARE
Problem: Chronic Conditions and Co-morbidities  Goal: Patient's chronic conditions and co-morbidity symptoms are monitored and maintained or improved  Outcome: Progressing  Flowsheets (Taken 11/14/2024 1316)  Care Plan - Patient's Chronic Conditions and Co-Morbidity Symptoms are Monitored and Maintained or Improved:   Monitor and assess patient's chronic conditions and comorbid symptoms for stability, deterioration, or improvement   Collaborate with multidisciplinary team to address chronic and comorbid conditions and prevent exacerbation or deterioration     Problem: Safety - Adult  Goal: Free from fall injury  Outcome: Progressing  Flowsheets (Taken 11/14/2024 1316)  Free From Fall Injury: Instruct family/caregiver on patient safety  Note: Instructed patient to use the call bell for assistance and making sure that the bed alarm is on.     Problem: Metabolic/Fluid and Electrolytes - Adult  Goal: Electrolytes maintained within normal limits  Outcome: Progressing  Flowsheets (Taken 11/14/2024 1316)  Electrolytes maintained within normal limits: Monitor labs and assess patient for signs and symptoms of electrolyte imbalances

## 2024-11-14 NOTE — PROGRESS NOTES
[facility]  Progress Note    Patient: Henrry Martinez Age: 64 y.o. : 1960 MR#: [id number] SSN: [id number]  Date: 2024     DVT Prophylaxis:  [x]Lovenox  []Hep SQ  []SCDs  []Coumadin   []On Heparin gtt []PO anticoagulant    Anticipated discharge: November 15, 2024 to home, pending clinical course and improvement of diarrhea    Subjective:     The patient was seen and examined at the bedside.   His abdominal pain has greatly improved but he continues to have profuse diarrhea.  Patient reports improvement of his abdominal pain however still having 4 bouts of diarrhea improved. Discussed need for improvement of diarrhea prior to eventual discharge home, encouraged oral intake.  Reports tolerating food well. Questions were answered to the best my ability    Patient requested that I do not update family today and that he will update family.   Objective:   VS: BP (!) 135/56   Pulse 81   Temp 97.7 °F (36.5 °C) (Oral)   Resp 17   Ht 1.854 m (6' 0.99\")   Wt 95.3 kg (210 lb)   SpO2 98%   BMI 27.71 kg/m²    Tmax/24hrs: Temp (24hrs), Av °F (36.7 °C), Min:97.7 °F (36.5 °C), Max:98.3 °F (36.8 °C)  No intake or output data in the 24 hours ending 24 1319    PHYSICAL EXAM  General Appearance: No acute distress, seen sitting in chair.  HENT: normocephalic/atraumatic, moist mucus membranes  Neck: No JVD, supple  Lungs: CTA with normal respiratory effort  CV: RRR, no m/r/g  Abdomen: soft, mildly tender in epigastrium and left lower quadrant but nondistended; bowel sounds slightly diminished; no organomegaly appreciated  : No Mason catheter; no suprapubic tenderness , no CVA tenderness  Extremities: no cyanosis, no peripheral edema  Neuro: No focal deficits, motor/sensory intact;   Skin: Warm and dry  Psych: appropriate mood and affect      Current Facility-Administered Medications   Medication Dose Route Frequency    insulin glargine (LANTUS) injection

## 2024-11-15 LAB
ANION GAP SERPL CALC-SCNC: 4 MMOL/L (ref 3–18)
BUN SERPL-MCNC: 9 MG/DL (ref 7–18)
BUN/CREAT SERPL: 18 (ref 12–20)
CALCIUM SERPL-MCNC: 8.1 MG/DL (ref 8.5–10.1)
CHLORIDE SERPL-SCNC: 108 MMOL/L (ref 100–111)
CO2 SERPL-SCNC: 26 MMOL/L (ref 21–32)
CREAT SERPL-MCNC: 0.5 MG/DL (ref 0.6–1.3)
GLUCOSE BLD STRIP.AUTO-MCNC: 126 MG/DL (ref 70–110)
GLUCOSE BLD STRIP.AUTO-MCNC: 200 MG/DL (ref 70–110)
GLUCOSE BLD STRIP.AUTO-MCNC: 266 MG/DL (ref 70–110)
GLUCOSE BLD STRIP.AUTO-MCNC: 296 MG/DL (ref 70–110)
GLUCOSE BLD STRIP.AUTO-MCNC: 302 MG/DL (ref 70–110)
GLUCOSE SERPL-MCNC: 337 MG/DL (ref 74–99)
MAGNESIUM SERPL-MCNC: 2 MG/DL (ref 1.6–2.6)
POTASSIUM SERPL-SCNC: 3.2 MMOL/L (ref 3.5–5.5)
SODIUM SERPL-SCNC: 138 MMOL/L (ref 136–145)

## 2024-11-15 PROCEDURE — 36415 COLL VENOUS BLD VENIPUNCTURE: CPT

## 2024-11-15 PROCEDURE — 6360000002 HC RX W HCPCS: Performed by: INTERNAL MEDICINE

## 2024-11-15 PROCEDURE — 94761 N-INVAS EAR/PLS OXIMETRY MLT: CPT

## 2024-11-15 PROCEDURE — 6370000000 HC RX 637 (ALT 250 FOR IP): Performed by: STUDENT IN AN ORGANIZED HEALTH CARE EDUCATION/TRAINING PROGRAM

## 2024-11-15 PROCEDURE — 82962 GLUCOSE BLOOD TEST: CPT

## 2024-11-15 PROCEDURE — 99232 SBSQ HOSP IP/OBS MODERATE 35: CPT | Performed by: STUDENT IN AN ORGANIZED HEALTH CARE EDUCATION/TRAINING PROGRAM

## 2024-11-15 PROCEDURE — 1100000000 HC RM PRIVATE

## 2024-11-15 PROCEDURE — 2580000003 HC RX 258: Performed by: INTERNAL MEDICINE

## 2024-11-15 PROCEDURE — 6370000000 HC RX 637 (ALT 250 FOR IP): Performed by: INTERNAL MEDICINE

## 2024-11-15 PROCEDURE — 83735 ASSAY OF MAGNESIUM: CPT

## 2024-11-15 PROCEDURE — 80048 BASIC METABOLIC PNL TOTAL CA: CPT

## 2024-11-15 RX ORDER — LOPERAMIDE HYDROCHLORIDE 2 MG/1
2 CAPSULE ORAL 4 TIMES DAILY PRN
Status: DISCONTINUED | OUTPATIENT
Start: 2024-11-15 | End: 2024-11-16 | Stop reason: HOSPADM

## 2024-11-15 RX ORDER — INSULIN GLARGINE 100 [IU]/ML
35 INJECTION, SOLUTION SUBCUTANEOUS NIGHTLY
Status: DISCONTINUED | OUTPATIENT
Start: 2024-11-15 | End: 2024-11-16

## 2024-11-15 RX ADMIN — INSULIN LISPRO 4 UNITS: 100 INJECTION, SOLUTION INTRAVENOUS; SUBCUTANEOUS at 08:23

## 2024-11-15 RX ADMIN — INSULIN LISPRO 4 UNITS: 100 INJECTION, SOLUTION INTRAVENOUS; SUBCUTANEOUS at 21:36

## 2024-11-15 RX ADMIN — SODIUM CHLORIDE, PRESERVATIVE FREE 10 ML: 5 INJECTION INTRAVENOUS at 08:12

## 2024-11-15 RX ADMIN — POTASSIUM CHLORIDE 40 MEQ: 1500 TABLET, EXTENDED RELEASE ORAL at 08:10

## 2024-11-15 RX ADMIN — FIDAXOMICIN 200 MG: 200 TABLET, FILM COATED ORAL at 21:36

## 2024-11-15 RX ADMIN — INSULIN LISPRO 6 UNITS: 100 INJECTION, SOLUTION INTRAVENOUS; SUBCUTANEOUS at 17:01

## 2024-11-15 RX ADMIN — LISINOPRIL 5 MG: 5 TABLET ORAL at 08:06

## 2024-11-15 RX ADMIN — LOPERAMIDE HYDROCHLORIDE 2 MG: 2 CAPSULE ORAL at 23:17

## 2024-11-15 RX ADMIN — SODIUM CHLORIDE, PRESERVATIVE FREE 10 ML: 5 INJECTION INTRAVENOUS at 21:43

## 2024-11-15 RX ADMIN — ENOXAPARIN SODIUM 40 MG: 100 INJECTION SUBCUTANEOUS at 17:01

## 2024-11-15 RX ADMIN — LOPERAMIDE HYDROCHLORIDE 2 MG: 2 CAPSULE ORAL at 17:11

## 2024-11-15 RX ADMIN — ACETAMINOPHEN 325MG 650 MG: 325 TABLET ORAL at 21:35

## 2024-11-15 RX ADMIN — INSULIN LISPRO 10 UNITS: 100 INJECTION, SOLUTION INTRAVENOUS; SUBCUTANEOUS at 11:41

## 2024-11-15 RX ADMIN — FIDAXOMICIN 200 MG: 200 TABLET, FILM COATED ORAL at 08:06

## 2024-11-15 RX ADMIN — SERTRALINE HYDROCHLORIDE 200 MG: 50 TABLET ORAL at 08:06

## 2024-11-15 RX ADMIN — INSULIN GLARGINE 35 UNITS: 100 INJECTION, SOLUTION SUBCUTANEOUS at 21:36

## 2024-11-15 RX ADMIN — INSULIN LISPRO 10 UNITS: 100 INJECTION, SOLUTION INTRAVENOUS; SUBCUTANEOUS at 08:11

## 2024-11-15 RX ADMIN — Medication 5 MG: at 21:36

## 2024-11-15 RX ADMIN — INSULIN LISPRO 10 UNITS: 100 INJECTION, SOLUTION INTRAVENOUS; SUBCUTANEOUS at 17:00

## 2024-11-15 ASSESSMENT — PAIN - FUNCTIONAL ASSESSMENT: PAIN_FUNCTIONAL_ASSESSMENT: PREVENTS OR INTERFERES SOME ACTIVE ACTIVITIES AND ADLS

## 2024-11-15 ASSESSMENT — PAIN SCALES - GENERAL
PAINLEVEL_OUTOF10: 0
PAINLEVEL_OUTOF10: 6
PAINLEVEL_OUTOF10: 0

## 2024-11-15 ASSESSMENT — PAIN DESCRIPTION - DESCRIPTORS: DESCRIPTORS: ACHING

## 2024-11-15 ASSESSMENT — PAIN DESCRIPTION - DIRECTION: RADIATING_TOWARDS: NO

## 2024-11-15 ASSESSMENT — PAIN DESCRIPTION - FREQUENCY: FREQUENCY: INTERMITTENT

## 2024-11-15 ASSESSMENT — PAIN DESCRIPTION - LOCATION: LOCATION: HEAD

## 2024-11-15 ASSESSMENT — PAIN DESCRIPTION - ONSET: ONSET: GRADUAL

## 2024-11-15 ASSESSMENT — PAIN DESCRIPTION - PAIN TYPE: TYPE: ACUTE PAIN

## 2024-11-15 ASSESSMENT — PAIN DESCRIPTION - ORIENTATION: ORIENTATION: MID

## 2024-11-15 NOTE — PLAN OF CARE
Problem: Chronic Conditions and Co-morbidities  Goal: Patient's chronic conditions and co-morbidity symptoms are monitored and maintained or improved  11/15/2024 0328 by Maria Alejandra Zavala, RN  Outcome: Adequate for Discharge  Flowsheets (Taken 11/15/2024 0259)  Care Plan - Patient's Chronic Conditions and Co-Morbidity Symptoms are Monitored and Maintained or Improved:   Monitor and assess patient's chronic conditions and comorbid symptoms for stability, deterioration, or improvement   Collaborate with multidisciplinary team to address chronic and comorbid conditions and prevent exacerbation or deterioration   Update acute care plan with appropriate goals if chronic or comorbid symptoms are exacerbated and prevent overall improvement and discharge  Note: Patient self care for his bathroom needs   Blood sugars have sliding scale coverage  Blood medications   Depression medications   Oral c-diff medication- dificid      11/14/2024 1537 by Meet Cabezas, RN  Flowsheets (Taken 11/14/2024 1501)  Care Plan - Patient's Chronic Conditions and Co-Morbidity Symptoms are Monitored and Maintained or Improved:   Monitor and assess patient's chronic conditions and comorbid symptoms for stability, deterioration, or improvement   Collaborate with multidisciplinary team to address chronic and comorbid conditions and prevent exacerbation or deterioration     Problem: Discharge Planning  Goal: Discharge to home or other facility with appropriate resources  11/15/2024 0328 by Maria Alejandra Zavala, RN  Outcome: Adequate for Discharge  Flowsheets (Taken 11/15/2024 0317)  Discharge to home or other facility with appropriate resources:   Identify barriers to discharge with patient and caregiver   Arrange for needed discharge resources and transportation as appropriate   Refer to discharge planning if patient needs post-hospital services based on physician order or complex needs related to functional status, cognitive ability or social support  adequate hydration   Maintain NPO status until nausea and vomiting are resolved   Provide nonpharmacologic comfort measures as appropriate   Administer ordered antiemetic medications as needed  Taken 11/14/2024 1955  Minimal or absence of nausea and vomiting:   Administer IV fluids as ordered to ensure adequate hydration   Administer ordered antiemetic medications as needed   Provide nonpharmacologic comfort measures as appropriate  Note: Patient says no more nausea for a few days   Encouraged fluids       Goal: Maintains or returns to baseline bowel function  11/15/2024 0328 by Maria Alejandra Zavala RN  Outcome: Adequate for Discharge  Flowsheets  Taken 11/15/2024 0319  Maintains or returns to baseline bowel function:   Assess bowel function   Encourage oral fluids to ensure adequate hydration   Administer IV fluids as ordered to ensure adequate hydration   Administer ordered medications as needed   Encourage mobilization and activity   Nutrition consult to assist patient with appropriate food choices  Taken 11/14/2024 1955  Maintains or returns to baseline bowel function: Assess bowel function  Note: Patient caring for his own needs in bathroom/bedside commode  Encouraged fluids   He states stools have slow some but 7-10 times a day      11/14/2024 1338 by Meet Cabezas, RN  Outcome: Progressing  Flowsheets (Taken 11/14/2024 1334)  Maintains or returns to baseline bowel function:   Assess bowel function   Encourage oral fluids to ensure adequate hydration   Administer ordered medications as needed  Goal: Maintains adequate nutritional intake  Outcome: Adequate for Discharge  Flowsheets  Taken 11/15/2024 0328  Maintains adequate nutritional intake:   Monitor percentage of each meal consumed   Identify factors contributing to decreased intake, treat as appropriate   Assist with meals as needed   Monitor intake and output, weight and lab values  Taken 11/14/2024 1955  Maintains adequate nutritional intake: Monitor

## 2024-11-15 NOTE — PROGRESS NOTES
[facility]  Progress Note    Patient: Henrry Martinez Age: 64 y.o. : 1960 MR#: [id number]: [id number]  Date: 11/15/2024     DVT Prophylaxis:  [x]Lovenox  []Hep SQ  []SCDs  []Coumadin   []On Heparin gtt []PO anticoagulant    Anticipated discharge: -2024 to home, pending clinical course and improvement of diarrhea    Subjective:     The patient was seen and examined at the bedside. Patient reports improvement of his abdominal pain however still having 4 bouts of diarrhea. Discussed need for improvement of diarrhea prior to eventual discharge home, encouraged oral intake.  Reports tolerating food well.  Has no further additional complaints. Questions were answered to the best my ability    Patient requested that I do not update family today and that he will update family.   Objective:   VS: BP (!) 142/70   Pulse 80   Temp 98.1 °F (36.7 °C) (Oral)   Resp 18   Ht 1.854 m (6' 0.99\")   Wt 95.3 kg (210 lb)   SpO2 98%   BMI 27.71 kg/m²    Tmax/24hrs: Temp (24hrs), Av.2 °F (36.8 °C), Min:98 °F (36.7 °C), Max:98.3 °F (36.8 °C)    Intake/Output Summary (Last 24 hours) at 11/15/2024 1536  Last data filed at 11/15/2024 0400  Gross per 24 hour   Intake 1440 ml   Output --   Net 1440 ml     PHYSICAL EXAM  General Appearance: No acute distress, seen lying in bed  HENT: normocephalic/atraumatic, moist mucus membranes  Neck: No JVD, supple  Lungs: CTA with normal respiratory effort  CV: RRR, no m/r/g  Abdomen: soft, mildly tender in epigastrium and left lower quadrant but nondistended; bowel sounds slightly diminished; no organomegaly appreciated  : No Mason catheter; no suprapubic tenderness , no CVA tenderness  Extremities: no cyanosis, no peripheral edema  Neuro: No focal deficits, motor/sensory intact;   Skin: Warm and dry  Psych: appropriate mood and affect      Current Facility-Administered Medications   Medication Dose Route Frequency

## 2024-11-15 NOTE — PLAN OF CARE
Problem: Chronic Conditions and Co-morbidities  Goal: Patient's chronic conditions and co-morbidity symptoms are monitored and maintained or improved  11/15/2024 1205 by Meet Cabezas RN  Outcome: Progressing  Note: Patient is still having a loose stools at this time.           Problem: Discharge Planning  Goal: Discharge to home or other facility with appropriate resources  11/15/2024 1205 by Meet Cabezas, RN  Outcome: Progressing  Note: Patient is not clinically ready for discharge, still having a loose stools.           Problem: Pain  Goal: Verbalizes/displays adequate comfort level or baseline comfort level  11/15/2024 1205 by Meet Cabezas, RN  Outcome: Progressing  Flowsheets (Taken 11/15/2024 1205)  Verbalizes/displays adequate comfort level or baseline comfort level:   Encourage patient to monitor pain and request assistance   Assess pain using appropriate pain scale  Note: Patient pain medication given as PRN order.         Problem: Safety - Adult  Goal: Free from fall injury  11/15/2024 1205 by Meet Cabezas, RN  Outcome: Progressing  Note: Instructed the patient to use the call bell for assistance and making sure that the patient bed alarm is on.         Problem: Skin/Tissue Integrity - Adult  Goal: Skin integrity remains intact  11/15/2024 1205 by Meet Cabezas, RN  Outcome: Progressing  Flowsheets (Taken 11/15/2024 1205)  Skin Integrity Remains Intact: Monitor for areas of redness and/or skin breakdown  Note: Skin is intact.          Problem: Metabolic/Fluid and Electrolytes - Adult  Goal: Electrolytes maintained within normal limits  11/15/2024 1205 by Meet Cabezas, RN  Outcome: Progressing  Flowsheets (Taken 11/15/2024 1205)  Electrolytes maintained within normal limits: Monitor labs and assess patient for signs and symptoms of electrolyte imbalances  Note: Patient potassium level today is 3.2 and potassium replacement given as ordered.         Problem: Metabolic/Fluid and  Electrolytes - Adult  Goal: Glucose maintained within prescribed range  11/15/2024 1205 by Meet Cabezas, RN  Outcome: Progressing  Flowsheets (Taken 11/15/2024 1205)  Glucose maintained within prescribed range: Monitor blood glucose as ordered  Note: Insulin 10 units SQ given 3 x daily.

## 2024-11-15 NOTE — PROGRESS NOTES
4 Eyes Skin Assessment     NAME:  Henrry Martinez  YOB: 1960  MEDICAL RECORD NUMBER:  [id number]    The patient is being assessed for  Shift Handoff    I agree that at least one RN has performed a thorough Head to Toe Skin Assessment on the patient. ALL assessment sites listed below have been assessed.      Areas assessed by both nurses:    Head, Face, Ears, Shoulders, Back, Chest, Arms, Elbows, Hands, Sacrum. Buttock, Coccyx, Ischium, Legs. Feet and Heels, and Under Medical Devices         Does the Patient have a Wound? No noted wound(s)       Prasanna Prevention initiated by RN: No  Wound Care Orders initiated by RN: No    Pressure Injury (Stage 3,4, Unstageable, DTI, NWPT, and Complex wounds) if present, place Wound referral order by RN under : No    New Ostomies, if present place, Ostomy referral order under : No     Nurse 1 eSignature: Electronically signed by PAULINE ARGUETA RN on 11/14/24 at 7:31 PM EST    **SHARE this note so that the co-signing nurse can place an eSignature**    Nurse 2 eSignature: Electronically signed by MICHAEL MERRILL RN on 11/14/24 at 8:07 PM EST

## 2024-11-15 NOTE — PROGRESS NOTES
Attempted to see patient for PT treatment however he was sleeping soundly and when awoken he politely requested to return back to sleep.  Will continue to follow. Maria Alejandra Wilkins, PT

## 2024-11-15 NOTE — PLAN OF CARE
Problem: Nutrition Deficit:  Goal: Optimize nutritional status  Outcome: Progressing  Flowsheets (Taken 11/15/2024 1043)  Nutrient intake appropriate for improving, restoring, or maintaining nutritional needs:   Assess nutritional status and recommend course of action   Monitor oral intake, labs, and treatment plans   Recommend appropriate diets, oral nutritional supplements, and vitamin/mineral supplements

## 2024-11-15 NOTE — PLAN OF CARE
Problem: Chronic Conditions and Co-morbidities  Goal: Patient's chronic conditions and co-morbidity symptoms are monitored and maintained or improved  11/14/2024 1537 by Meet Cabezas RN  Flowsheets (Taken 11/14/2024 1501)  Care Plan - Patient's Chronic Conditions and Co-Morbidity Symptoms are Monitored and Maintained or Improved:   Monitor and assess patient's chronic conditions and comorbid symptoms for stability, deterioration, or improvement   Collaborate with multidisciplinary team to address chronic and comorbid conditions and prevent exacerbation or deterioration  11/14/2024 1316 by Meet Cabezas, RN  Outcome: Progressing  Flowsheets (Taken 11/14/2024 1316)  Care Plan - Patient's Chronic Conditions and Co-Morbidity Symptoms are Monitored and Maintained or Improved:   Monitor and assess patient's chronic conditions and comorbid symptoms for stability, deterioration, or improvement   Collaborate with multidisciplinary team to address chronic and comorbid conditions and prevent exacerbation or deterioration     Problem: Discharge Planning  Goal: Discharge to home or other facility with appropriate resources  11/14/2024 1537 by Meet Cabezas RN  Flowsheets (Taken 11/14/2024 1501)  Discharge to home or other facility with appropriate resources: Identify barriers to discharge with patient and caregiver  11/14/2024 1338 by Meet Cabezas, RN  Outcome: Progressing  Flowsheets (Taken 11/14/2024 1334)  Discharge to home or other facility with appropriate resources: Identify barriers to discharge with patient and caregiver     Problem: Pain  Goal: Verbalizes/displays adequate comfort level or baseline comfort level  11/14/2024 1537 by Meet Cabezas, RN  Flowsheets (Taken 11/14/2024 1501)  Verbalizes/displays adequate comfort level or baseline comfort level:   Encourage patient to monitor pain and request assistance   Assess pain using appropriate pain scale  Note: Assessed patient every 4 hours for pain.  11/14/2024 1334)  Absence of infection at discharge: Assess and monitor for signs and symptoms of infection     Problem: Cardiovascular - Adult  Goal: Maintains optimal cardiac output and hemodynamic stability  Recent Flowsheet Documentation  Taken 11/14/2024 1955 by Maria Alejandra Zavala, RN  Maintains optimal cardiac output and hemodynamic stability:   Monitor blood pressure and heart rate   Assess for signs of decreased cardiac output   Monitor urine output and notify Licensed Independent Practitioner for values outside of normal range

## 2024-11-15 NOTE — PROGRESS NOTES
[email or web address]  [phone]    Gastroenterology follow up-Progress note    Impression:  1. Severe nausea/vomiting - vomiting resolved  2. Diarrhea  - 11/8/24 CT abd/pelvis - severe pancolitis and hepatomegaly - suggestive of infectious process, suspicious for c diff.  - C diff positive 11/10/24 - on Dificid  - O&P pending  3. Hyperglycemia   4. Hepatosplenomegaly  - needs further eval once colitis process resolved   - LFTs normal    Plan:  1. Continue Dificid and complete full course  2. Antiemetic prn  3. Medical management as per primary team  4. Needs outpatient colonoscopy once colitis has resolved as follow up and he was due for 10yr colonoscopy CRCS recall 7/2024. Dr. Cl Angelo at [facility] (colonoscopy 2014) or our office per pt preference     Thank you for this consultation and the opportunity to participate in the care of this patient. Please do not hesitate to call with any questions or concerns, or should event occur that may necessitate additional GI evaluation.     Chief Complaint: Pt reports mild improvement since yesterday. He endorses 4 Bms since 12am, denies rectal bleeding. He has mild lower abdominal pain which is improved since yesterday. He felt promising improvement yesterday morning as stools were more formed and slower, however stool frequency increased after lunch.       Subjective:  C diff    ROS: Denies any fevers, chills, rash.       General: well developed, overweight, no acute distress  Eyes: conjunctiva normal, EOM normal  Cardiovascular: heart normal, intact distal pulses, normal rate and regular rhythm  Pulmonary: breath sounds normal and effort normal  Abdominal: appearance normal, bowel sounds normal and soft, non-acute, non-tender     Patient Active Problem List   Diagnosis    Pancolitis (HCC)    Nausea and vomiting    Essential hypertension    Type 2 diabetes mellitus, without long-term current use of insulin (HCC)    Dystonia    Depression

## 2024-11-15 NOTE — PROGRESS NOTES
4 Eyes Skin Assessment     NAME:  Henrry Martinez  YOB: 1960  MEDICAL RECORD NUMBER:  [id number]    The patient is being assessed for  Shift Handoff    I agree that at least one RN has performed a thorough Head to Toe Skin Assessment on the patient. ALL assessment sites listed below have been assessed.      Areas assessed by both nurses:    Head, Face, Ears, Shoulders, Back, Chest, Arms, Elbows, Hands, Sacrum. Buttock, Coccyx, Ischium, and Legs. Feet and Heels        Does the Patient have a Wound? No noted wound(s)       Prasanna Prevention initiated by RN: No  Wound Care Orders initiated by RN: No    Pressure Injury (Stage 3,4, Unstageable, DTI, NWPT, and Complex wounds) if present, place Wound referral order by RN under : No    New Ostomies, if present place, Ostomy referral order under : No     Nurse 1 eSignature: Electronically signed by MICHAEL MERRILL RN on 11/15/24 at 8:50 AM EST    **SHARE this note so that the co-signing nurse can place an eSignature**    Nurse 2 eSignature: Electronically signed by PAULINE ARGUETA RN on 11/15/24 at 7:23 PM EST

## 2024-11-15 NOTE — PROGRESS NOTES
Nutrition Assessment     Type and Reason for Visit: Reassess    Nutrition Recommendations/Plan:   Continue current plan     Malnutrition Assessment:  Malnutrition Status: Insufficient data    Nutrition Assessment:  Abd pain improved but pt still having profuse diarrhea per MD notes. No new weights. Diet is CCHO as of 11/12. Previously was full liquid. Attempted to call patient's cell phone and room phone, did not answer    Estimated Daily Nutrient Needs:  Energy (kcal):  3592-0604 kcal (MSJ x 1.2-1.4) Weight Used for Energy Requirements: Current     Protein (g):   g (1-1.2 g/kg) Weight Used for Protein Requirements: Current        Fluid (ml/day):  0930-3304 mL (1 mL/kcal or per MD) Method Used for Fluid Requirements: 1 ml/kcal    Nutrition Related Findings:   K+ 3.2, POCG elevated. +diarrhea. Wound Type: None    Current Nutrition Therapies:    ADULT ORAL NUTRITION SUPPLEMENT; Breakfast, Lunch, Dinner; Low Calorie/High Protein Oral Supplement  ADULT DIET; Regular; 4 carb choices (60 gm/meal)    Anthropometric Measures:  Height: 185.4 cm (6' 0.99\")  Current Body Wt: 95.3 kg (210 lb 1.6 oz)   BMI: 27.7        Nutrition Diagnosis:   Inadequate oral intake related to altered GI function as evidenced by intake 0-25%, nausea, diarrhea    Nutrition Interventions:   Food and/or Nutrient Delivery: Continue Current Diet, Continue Oral Nutrition Supplement  Nutrition Education/Counseling: No recommendation at this time  Coordination of Nutrition Care: Continue to monitor while inpatient  Plan of Care discussed with: attempted to reach patient, did not answer phone x2    Goals:  Goals: Meet at least 75% of estimated needs, by next RD assessment  Type of Goal: Continue current goal  Previous Goal Met: Progressing toward Goal(s)    Nutrition Monitoring and Evaluation:   Behavioral-Environmental Outcomes: None Identified  Food/Nutrient Intake Outcomes: Food and Nutrient Intake, Supplement Intake  Physical Signs/Symptoms  Outcomes: Biochemical Data, GI Status, Fluid Status or Edema, Skin, Weight, Meal Time Behavior    Discharge Planning:    No discharge needs at this time     Anh Giraldo RD  Contact: [phone]

## 2024-11-16 VITALS
OXYGEN SATURATION: 96 % | TEMPERATURE: 97.9 F | RESPIRATION RATE: 17 BRPM | HEART RATE: 87 BPM | DIASTOLIC BLOOD PRESSURE: 74 MMHG | BODY MASS INDEX: 27.83 KG/M2 | HEIGHT: 73 IN | SYSTOLIC BLOOD PRESSURE: 132 MMHG | WEIGHT: 210 LBS

## 2024-11-16 LAB
ANION GAP SERPL CALC-SCNC: 4 MMOL/L (ref 3–18)
BUN SERPL-MCNC: 10 MG/DL (ref 7–18)
BUN/CREAT SERPL: 20 (ref 12–20)
CALCIUM SERPL-MCNC: 8 MG/DL (ref 8.5–10.1)
CHLORIDE SERPL-SCNC: 107 MMOL/L (ref 100–111)
CO2 SERPL-SCNC: 26 MMOL/L (ref 21–32)
CREAT SERPL-MCNC: 0.5 MG/DL (ref 0.6–1.3)
GLUCOSE BLD STRIP.AUTO-MCNC: 181 MG/DL (ref 70–110)
GLUCOSE BLD STRIP.AUTO-MCNC: 243 MG/DL (ref 70–110)
GLUCOSE SERPL-MCNC: 282 MG/DL (ref 74–99)
POTASSIUM SERPL-SCNC: 3.4 MMOL/L (ref 3.5–5.5)
SODIUM SERPL-SCNC: 137 MMOL/L (ref 136–145)

## 2024-11-16 PROCEDURE — 6370000000 HC RX 637 (ALT 250 FOR IP): Performed by: STUDENT IN AN ORGANIZED HEALTH CARE EDUCATION/TRAINING PROGRAM

## 2024-11-16 PROCEDURE — 82962 GLUCOSE BLOOD TEST: CPT

## 2024-11-16 PROCEDURE — 36415 COLL VENOUS BLD VENIPUNCTURE: CPT

## 2024-11-16 PROCEDURE — 6370000000 HC RX 637 (ALT 250 FOR IP): Performed by: INTERNAL MEDICINE

## 2024-11-16 PROCEDURE — 94761 N-INVAS EAR/PLS OXIMETRY MLT: CPT

## 2024-11-16 PROCEDURE — 80048 BASIC METABOLIC PNL TOTAL CA: CPT

## 2024-11-16 PROCEDURE — 2580000003 HC RX 258: Performed by: INTERNAL MEDICINE

## 2024-11-16 RX ORDER — LOPERAMIDE HYDROCHLORIDE 2 MG/1
2 CAPSULE ORAL 4 TIMES DAILY PRN
Qty: 40 CAPSULE | Refills: 0 | Status: SHIPPED | OUTPATIENT
Start: 2024-11-16 | End: 2024-11-26

## 2024-11-16 RX ORDER — INSULIN GLARGINE 100 [IU]/ML
40 INJECTION, SOLUTION SUBCUTANEOUS NIGHTLY
Status: DISCONTINUED | OUTPATIENT
Start: 2024-11-16 | End: 2024-11-16 | Stop reason: HOSPADM

## 2024-11-16 RX ADMIN — INSULIN LISPRO 2 UNITS: 100 INJECTION, SOLUTION INTRAVENOUS; SUBCUTANEOUS at 08:05

## 2024-11-16 RX ADMIN — SERTRALINE HYDROCHLORIDE 200 MG: 50 TABLET ORAL at 08:03

## 2024-11-16 RX ADMIN — INSULIN LISPRO 10 UNITS: 100 INJECTION, SOLUTION INTRAVENOUS; SUBCUTANEOUS at 08:04

## 2024-11-16 RX ADMIN — SODIUM CHLORIDE, PRESERVATIVE FREE 10 ML: 5 INJECTION INTRAVENOUS at 08:06

## 2024-11-16 RX ADMIN — POTASSIUM CHLORIDE 40 MEQ: 1500 TABLET, EXTENDED RELEASE ORAL at 09:53

## 2024-11-16 RX ADMIN — FIDAXOMICIN 200 MG: 200 TABLET, FILM COATED ORAL at 08:03

## 2024-11-16 RX ADMIN — LISINOPRIL 5 MG: 5 TABLET ORAL at 08:03

## 2024-11-16 RX ADMIN — INSULIN LISPRO 2 UNITS: 100 INJECTION, SOLUTION INTRAVENOUS; SUBCUTANEOUS at 11:51

## 2024-11-16 RX ADMIN — INSULIN LISPRO 10 UNITS: 100 INJECTION, SOLUTION INTRAVENOUS; SUBCUTANEOUS at 11:51

## 2024-11-16 ASSESSMENT — PAIN SCALES - GENERAL
PAINLEVEL_OUTOF10: 0

## 2024-11-16 NOTE — CARE COORDINATION
D/C order noted for today. Orders reviewed. No needs identified at this time. Patient friend/co-worker is at  patient beside. Friend/ co-worker will transport patient. CM remains available if needed.          Jimenez Nova BSW  Case Management

## 2024-11-16 NOTE — PROGRESS NOTES
Discharge patient in stable condition, discharge instructions given and understood. IV line removed aseptically.

## 2024-11-16 NOTE — PLAN OF CARE
Problem: Chronic Conditions and Co-morbidities  Goal: Patient's chronic conditions and co-morbidity symptoms are monitored and maintained or improved  11/16/2024 0334 by Maria Alejandra Zavala RN  Outcome: Adequate for Discharge  Flowsheets (Taken 11/15/2024 2006)  Care Plan - Patient's Chronic Conditions and Co-Morbidity Symptoms are Monitored and Maintained or Improved: Monitor and assess patient's chronic conditions and comorbid symptoms for stability, deterioration, or improvement     Problem: Discharge Planning  Goal: Discharge to home or other facility with appropriate resources  11/16/2024 0334 by Maria Alejandra Zavala RN  Outcome: Adequate for Discharge  Flowsheets (Taken 11/15/2024 2006)  Discharge to home or other facility with appropriate resources: Identify barriers to discharge with patient and caregiver     Problem: Pain  Goal: Verbalizes/displays adequate comfort level or baseline comfort level  Recent Flowsheet Documentation  Taken 11/16/2024 1051 by Meet Cabezas RN  Verbalizes/displays adequate comfort level or baseline comfort level: Encourage patient to monitor pain and request assistance  11/16/2024 0334 by Maria Alejandra Zavala RN  Outcome: Adequate for Discharge  Flowsheets (Taken 11/15/2024 1956)  Verbalizes/displays adequate comfort level or baseline comfort level: Encourage patient to monitor pain and request assistance     Problem: Safety - Adult  Goal: Free from fall injury  11/16/2024 0334 by Maria Alejandra Zavala RN  Outcome: Adequate for Discharge     Problem: Cardiovascular - Adult  Goal: Maintains optimal cardiac output and hemodynamic stability  11/16/2024 0334 by Maria Alejandra Zavala RN  Outcome: Adequate for Discharge  Flowsheets (Taken 11/15/2024 2006)  Maintains optimal cardiac output and hemodynamic stability:   Monitor blood pressure and heart rate   Monitor urine output and notify Licensed Independent Practitioner for values outside of normal range     Problem: Skin/Tissue Integrity - Adult  Goal:  Skin integrity remains intact  11/16/2024 0334 by Maria Alejandra Zavala RN  Outcome: Adequate for Discharge  Flowsheets (Taken 11/15/2024 2006)  Skin Integrity Remains Intact: Monitor for areas of redness and/or skin breakdown     Problem: Gastrointestinal - Adult  Goal: Minimal or absence of nausea and vomiting  11/16/2024 0334 by Maria Alejandra Zavala RN  Outcome: Adequate for Discharge  Flowsheets (Taken 11/15/2024 2006)  Minimal or absence of nausea and vomiting: Administer IV fluids as ordered to ensure adequate hydration  Goal: Maintains or returns to baseline bowel function  Recent Flowsheet Documentation  Taken 11/16/2024 1051 by Meet Cabezas RN  Maintains or returns to baseline bowel function:   Assess bowel function   Encourage oral fluids to ensure adequate hydration   Administer ordered medications as needed  11/16/2024 0334 by Maria Alejandra Zavala RN  Outcome: Adequate for Discharge  Flowsheets (Taken 11/15/2024 2006)  Maintains or returns to baseline bowel function: Assess bowel function  Goal: Maintains adequate nutritional intake  11/16/2024 0334 by Maria Alejandra Zavala RN  Outcome: Adequate for Discharge  Flowsheets (Taken 11/15/2024 2006)  Maintains adequate nutritional intake: Monitor percentage of each meal consumed     Problem: Metabolic/Fluid and Electrolytes - Adult  Goal: Electrolytes maintained within normal limits  Recent Flowsheet Documentation  Taken 11/16/2024 1051 by Meet Cabezas RN  Electrolytes maintained within normal limits: Monitor labs and assess patient for signs and symptoms of electrolyte imbalances  11/16/2024 0334 by Maria Alejandra Zavala RN  Outcome: Adequate for Discharge  Flowsheets (Taken 11/15/2024 2006)  Electrolytes maintained within normal limits:   Monitor labs and assess patient for signs and symptoms of electrolyte imbalances   Administer electrolyte replacement as ordered  Goal: Glucose maintained within prescribed range  11/16/2024 0334 by Maria Alejandra Zavala RN  Outcome: Adequate for

## 2024-11-16 NOTE — PROGRESS NOTES
4 Eyes Skin Assessment     NAME:  Henrry Martinez  YOB: 1960  MEDICAL RECORD NUMBER:  [id number]    The patient is being assessed for  Shift Handoff    I agree that at least one RN has performed a thorough Head to Toe Skin Assessment on the patient. ALL assessment sites listed below have been assessed.      Areas assessed by both nurses:    Head, Face, Ears, Shoulders, Back, Chest, Arms, Elbows, Hands, Sacrum. Buttock, Coccyx, Ischium, and Legs. Feet and Heels        Does the Patient have a Wound? No noted wound(s)       Prasanna Prevention initiated by RN: No  Wound Care Orders initiated by RN: No    Pressure Injury (Stage 3,4, Unstageable, DTI, NWPT, and Complex wounds) if present, place Wound referral order by RN under : No    New Ostomies, if present place, Ostomy referral order under : No     Nurse 1 eSignature: Electronically signed by MICHAEL MERRILL RN on 11/16/24 at 5:49 AM EST    **SHARE this note so that the co-signing nurse can place an eSignature**    Nurse 2 eSignature: Electronically signed by PAULINE ARGUETA RN on 11/16/24 at 7:20 AM EST

## 2024-11-16 NOTE — PLAN OF CARE
Problem: Chronic Conditions and Co-morbidities  Goal: Patient's chronic conditions and co-morbidity symptoms are monitored and maintained or improved  Outcome: Adequate for Discharge  Flowsheets (Taken 11/15/2024 2006)  Care Plan - Patient's Chronic Conditions and Co-Morbidity Symptoms are Monitored and Maintained or Improved: Monitor and assess patient's chronic conditions and comorbid symptoms for stability, deterioration, or improvement     Problem: Discharge Planning  Goal: Discharge to home or other facility with appropriate resources  Outcome: Adequate for Discharge  Flowsheets (Taken 11/15/2024 2006)  Discharge to home or other facility with appropriate resources: Identify barriers to discharge with patient and caregiver     Problem: Pain  Goal: Verbalizes/displays adequate comfort level or baseline comfort level  Outcome: Adequate for Discharge  Flowsheets (Taken 11/15/2024 1956)  Verbalizes/displays adequate comfort level or baseline comfort level: Encourage patient to monitor pain and request assistance     Problem: Safety - Adult  Goal: Free from fall injury  Outcome: Adequate for Discharge     Problem: Cardiovascular - Adult  Goal: Maintains optimal cardiac output and hemodynamic stability  Outcome: Adequate for Discharge  Flowsheets (Taken 11/15/2024 2006)  Maintains optimal cardiac output and hemodynamic stability:   Monitor blood pressure and heart rate   Monitor urine output and notify Licensed Independent Practitioner for values outside of normal range     Problem: Skin/Tissue Integrity - Adult  Goal: Skin integrity remains intact  Outcome: Adequate for Discharge  Flowsheets (Taken 11/15/2024 2006)  Skin Integrity Remains Intact: Monitor for areas of redness and/or skin breakdown     Problem: Gastrointestinal - Adult  Goal: Minimal or absence of nausea and vomiting  Outcome: Adequate for Discharge  Flowsheets (Taken 11/15/2024 2006)  Minimal or absence of nausea and vomiting: Administer IV fluids  as ordered to ensure adequate hydration  Goal: Maintains or returns to baseline bowel function  Outcome: Adequate for Discharge  Flowsheets (Taken 11/15/2024 2006)  Maintains or returns to baseline bowel function: Assess bowel function  Goal: Maintains adequate nutritional intake  Outcome: Adequate for Discharge  Flowsheets (Taken 11/15/2024 2006)  Maintains adequate nutritional intake: Monitor percentage of each meal consumed     Problem: Metabolic/Fluid and Electrolytes - Adult  Goal: Electrolytes maintained within normal limits  Outcome: Adequate for Discharge  Flowsheets (Taken 11/15/2024 2006)  Electrolytes maintained within normal limits:   Monitor labs and assess patient for signs and symptoms of electrolyte imbalances   Administer electrolyte replacement as ordered  Goal: Glucose maintained within prescribed range  Outcome: Adequate for Discharge  Flowsheets (Taken 11/15/2024 2006)  Glucose maintained within prescribed range:   Monitor blood glucose as ordered   Assess for signs and symptoms of hyperglycemia and hypoglycemia   Administer ordered medications to maintain glucose within target range   Assess barriers to adequate nutritional intake and initiate nutrition consult as needed     Problem: Infection - Adult  Goal: Absence of infection at discharge  Outcome: Adequate for Discharge  Flowsheets (Taken 11/15/2024 2006)  Absence of infection at discharge: Assess and monitor for signs and symptoms of infection     Problem: Musculoskeletal - Adult  Goal: Return mobility to safest level of function  Outcome: Adequate for Discharge  Flowsheets (Taken 11/15/2024 2006)  Return Mobility to Safest Level of Function: Assess patient stability and activity tolerance for standing, transferring and ambulating with or without assistive devices  Goal: Return ADL status to a safe level of function  Outcome: Adequate for Discharge  Flowsheets (Taken 11/15/2024 2006)  Return ADL Status to a Safe Level of Function:

## 2024-11-16 NOTE — PROGRESS NOTES
4 Eyes Skin Assessment     NAME:  Henrry Martinez  YOB: 1960  MEDICAL RECORD NUMBER:  [id number]    The patient is being assessed for  Shift Handoff    I agree that at least one RN has performed a thorough Head to Toe Skin Assessment on the patient. ALL assessment sites listed below have been assessed.      Areas assessed by both nurses:    Head, Face, Ears, Shoulders, Back, Chest, Arms, Elbows, Hands, Sacrum. Buttock, Coccyx, Ischium, Legs. Feet and Heels, and Under Medical Devices         Does the Patient have a Wound? No noted wound(s)       Prasanna Prevention initiated by RN: No  Wound Care Orders initiated by RN: No    Pressure Injury (Stage 3,4, Unstageable, DTI, NWPT, and Complex wounds) if present, place Wound referral order by RN under : No    New Ostomies, if present place, Ostomy referral order under : No     Nurse 1 eSignature: Electronically signed by PAULINE ARGUETA RN on 11/15/24 at 7:23 PM EST    **SHARE this note so that the co-signing nurse can place an eSignature**    Nurse 2 eSignature: Electronically signed by MICHAEL MERRILL RN on 11/16/24 at 5:49 AM EST

## 2024-11-16 NOTE — PLAN OF CARE
Problem: Skin/Tissue Integrity  Goal: Absence of new skin breakdown  Description: 1.  Monitor for areas of redness and/or skin breakdown  2.  Assess vascular access sites hourly  3.  Every 4-6 hours minimum:  Change oxygen saturation probe site  4.  Every 4-6 hours:  If on nasal continuous positive airway pressure, respiratory therapy assess nares and determine need for appliance change or resting period.  Outcome: Progressing  Note: Skin is intact and free of wounds.     Problem: Gastrointestinal - Adult  Goal: Maintains or returns to baseline bowel function  Outcome: Progressing  Flowsheets (Taken 11/16/2024 1051)  Maintains or returns to baseline bowel function:   Assess bowel function   Encourage oral fluids to ensure adequate hydration   Administer ordered medications as needed  Note: Patient had only 1 bowel movement today.     Problem: Metabolic/Fluid and Electrolytes - Adult  Goal: Electrolytes maintained within normal limits  Outcome: Progressing  Flowsheets (Taken 11/16/2024 1051)  Electrolytes maintained within normal limits: Monitor labs and assess patient for signs and symptoms of electrolyte imbalances  Note: Patient potassium level is 3.4, Potassium replacement given as ordered.

## 2024-11-16 NOTE — DISCHARGE INSTRUCTIONS
Continue fidaxomicin (antibiotic for your C. difficile infection) for 3 more days.  Imodium as needed given for diarrhea  Continue present medication.  Strongly encouraged follow-up with primary care provider regarding overall health and diabetes management.   Follow-up with GI for outpatient colonoscopy.    Discussed with the patient and all questioned fully answered. He will call me if any problems arise.     DISCHARGE SUMMARY from Nurse    PATIENT INSTRUCTIONS:  What to do at Home:  Recommended activity: activity as tolerated,     If you experience any of the following symptoms like severe abdominal pain, worsen diarrhea, and vomiting, please follow up with emergency department or primary MD.    *  Please give a list of your current medications to your Primary Care Provider.    *  Please update this list whenever your medications are discontinued, doses are      changed, or new medications (including over-the-counter products) are added.    *  Please carry medication information at all times in case of emergency situations.    These are general instructions for a healthy lifestyle:    No smoking/ No tobacco products/ Avoid exposure to second hand smoke  Surgeon General's Warning:  Quitting smoking now greatly reduces serious risk to your health.    Obesity, smoking, and sedentary lifestyle greatly increases your risk for illness    A healthy diet, regular physical exercise & weight monitoring are important for maintaining a healthy lifestyle    You may be retaining fluid if you have a history of heart failure or if you experience any of the following symptoms:  Weight gain of 3 pounds or more overnight or 5 pounds in a week, increased swelling in our hands or feet or shortness of breath while lying flat in bed.  Please call your doctor as soon as you notice any of these symptoms; do not wait until your next office visit.        The discharge information has been reviewed with the patient.  The patient verbalized

## 2025-01-29 ENCOUNTER — APPOINTMENT (OUTPATIENT)
Facility: HOSPITAL | Age: 65
End: 2025-01-29

## 2025-01-29 ENCOUNTER — HOSPITAL ENCOUNTER (EMERGENCY)
Facility: HOSPITAL | Age: 65
Discharge: HOME | End: 2025-01-30

## 2025-01-29 VITALS
WEIGHT: 210 LBS | HEIGHT: 74 IN | SYSTOLIC BLOOD PRESSURE: 175 MMHG | HEART RATE: 90 BPM | RESPIRATION RATE: 15 BRPM | OXYGEN SATURATION: 98 % | BODY MASS INDEX: 26.95 KG/M2 | TEMPERATURE: 98.6 F | DIASTOLIC BLOOD PRESSURE: 72 MMHG

## 2025-01-29 DIAGNOSIS — R73.9 HYPERGLYCEMIA: Primary | ICD-10-CM

## 2025-01-29 DIAGNOSIS — L02.91 ABSCESS: ICD-10-CM

## 2025-01-29 LAB
ALBUMIN SERPL-MCNC: 3.2 G/DL (ref 3.4–5)
ALBUMIN/GLOB SERPL: 1.1 (ref 0.8–1.7)
ALP SERPL-CCNC: 127 U/L (ref 45–117)
ALT SERPL-CCNC: 21 U/L (ref 16–61)
ANION GAP SERPL CALC-SCNC: 6 MMOL/L (ref 3–18)
AST SERPL-CCNC: 15 U/L (ref 10–38)
BASOPHILS # BLD: 0.07 K/UL (ref 0–0.1)
BASOPHILS NFR BLD: 1.2 % (ref 0–2)
BILIRUB SERPL-MCNC: 1.4 MG/DL (ref 0.2–1)
BUN SERPL-MCNC: 13 MG/DL (ref 7–18)
BUN/CREAT SERPL: 16 (ref 12–20)
CALCIUM SERPL-MCNC: 8.6 MG/DL (ref 8.5–10.1)
CHLORIDE SERPL-SCNC: 101 MMOL/L (ref 100–111)
CO2 SERPL-SCNC: 28 MMOL/L (ref 21–32)
CREAT SERPL-MCNC: 0.83 MG/DL (ref 0.6–1.3)
DIFFERENTIAL METHOD BLD: ABNORMAL
EOSINOPHIL # BLD: 0.1 K/UL (ref 0–0.4)
EOSINOPHIL NFR BLD: 1.7 % (ref 0–5)
ERYTHROCYTE [DISTWIDTH] IN BLOOD BY AUTOMATED COUNT: 12.6 % (ref 11.6–14.5)
GLOBULIN SER CALC-MCNC: 2.9 G/DL (ref 2–4)
GLUCOSE SERPL-MCNC: 416 MG/DL (ref 74–99)
HCT VFR BLD AUTO: 43.2 % (ref 36–48)
HGB BLD-MCNC: 14.7 G/DL (ref 13–16)
IMM GRANULOCYTES # BLD AUTO: 0.07 K/UL (ref 0–0.04)
IMM GRANULOCYTES NFR BLD AUTO: 1.2 % (ref 0–0.5)
LYMPHOCYTES # BLD: 2.05 K/UL (ref 0.9–3.6)
LYMPHOCYTES NFR BLD: 34 % (ref 21–52)
MCH RBC QN AUTO: 29.2 PG (ref 24–34)
MCHC RBC AUTO-ENTMCNC: 34 G/DL (ref 31–37)
MCV RBC AUTO: 85.7 FL (ref 78–100)
MONOCYTES # BLD: 0.41 K/UL (ref 0.05–1.2)
MONOCYTES NFR BLD: 6.8 % (ref 3–10)
NEUTS SEG # BLD: 3.33 K/UL (ref 1.8–8)
NEUTS SEG NFR BLD: 55.1 % (ref 40–73)
NRBC # BLD: 0 K/UL (ref 0–0.01)
NRBC BLD-RTO: 0 PER 100 WBC
PLATELET # BLD AUTO: 167 K/UL (ref 135–420)
PMV BLD AUTO: 10 FL (ref 9.2–11.8)
POTASSIUM SERPL-SCNC: 4.4 MMOL/L (ref 3.5–5.5)
PROT SERPL-MCNC: 6.1 G/DL (ref 6.4–8.2)
RBC # BLD AUTO: 5.04 M/UL (ref 4.35–5.65)
SODIUM SERPL-SCNC: 135 MMOL/L (ref 136–145)
WBC # BLD AUTO: 6 K/UL (ref 4.6–13.2)

## 2025-01-29 PROCEDURE — 96360 HYDRATION IV INFUSION INIT: CPT

## 2025-01-29 PROCEDURE — 2580000003 HC RX 258

## 2025-01-29 PROCEDURE — 6360000004 HC RX CONTRAST MEDICATION

## 2025-01-29 PROCEDURE — 74177 CT ABD & PELVIS W/CONTRAST: CPT

## 2025-01-29 PROCEDURE — 85025 COMPLETE CBC W/AUTO DIFF WBC: CPT

## 2025-01-29 PROCEDURE — 80053 COMPREHEN METABOLIC PANEL: CPT

## 2025-01-29 PROCEDURE — 6370000000 HC RX 637 (ALT 250 FOR IP)

## 2025-01-29 PROCEDURE — 99285 EMERGENCY DEPT VISIT HI MDM: CPT

## 2025-01-29 RX ORDER — IOPAMIDOL 612 MG/ML
90 INJECTION, SOLUTION INTRAVASCULAR
Status: COMPLETED | OUTPATIENT
Start: 2025-01-29 | End: 2025-01-29

## 2025-01-29 RX ORDER — DOXYCYCLINE HYCLATE 100 MG
100 TABLET ORAL 2 TIMES DAILY
Qty: 20 TABLET | Refills: 0 | Status: SHIPPED | OUTPATIENT
Start: 2025-01-29 | End: 2025-02-08

## 2025-01-29 RX ORDER — 0.9 % SODIUM CHLORIDE 0.9 %
1000 INTRAVENOUS SOLUTION INTRAVENOUS ONCE
Status: COMPLETED | OUTPATIENT
Start: 2025-01-29 | End: 2025-01-30

## 2025-01-29 RX ADMIN — SODIUM CHLORIDE 1000 ML: 9 INJECTION, SOLUTION INTRAVENOUS at 23:40

## 2025-01-29 RX ADMIN — IOPAMIDOL 90 ML: 612 INJECTION, SOLUTION INTRAVENOUS at 22:23

## 2025-01-29 RX ADMIN — INSULIN HUMAN 5 UNITS: 100 INJECTION, SOLUTION PARENTERAL at 23:40

## 2025-01-30 LAB — GLUCOSE BLD STRIP.AUTO-MCNC: 308 MG/DL (ref 70–110)

## 2025-01-30 PROCEDURE — 82962 GLUCOSE BLOOD TEST: CPT

## 2025-01-30 NOTE — ED TRIAGE NOTES
Arrived ambulatory to triage c/o RIGHT groin pain and lump. States there is clear scant drainage. Noticed on Sunday.

## 2025-01-30 NOTE — ED PROVIDER NOTES
(L) 3.4 - 5.0 g/dL    Globulin 2.9 2.0 - 4.0 g/dL    Albumin/Globulin Ratio 1.1 0.8 - 1.7     POCT Glucose    Collection Time: 01/30/25 12:43 AM   Result Value Ref Range    POC Glucose 308 (H) 70 - 110 mg/dL       Radiologic Studies:   CT ABDOMEN PELVIS W IV CONTRAST Additional Contrast? Radiologist Recommendation   Final Result      1. No acute abnormalities.      2. No right inguinal cellulitis, abscess, mass or adenopathy.      Electronically signed by Owen Hidalgo            Procedures     Procedures    ED Course     10:50 PM RUPESH CHOWDHURY (Shawanda Webb PA-C) am the first provider for this patient. Initial assessment performed. I reviewed the vital signs, available nursing notes, past medical history, past surgical history, family history and social history. The patients presenting problems have been discussed, and they are in agreement with the care plan formulated and outlined with them.  I have encouraged them to ask questions as they arise throughout their visit.    Records Reviewed: Nursing Notes and Old Medical Records    Is this patient to be included in the SEP-1 core measure? No Exclusion criteria - the patient is NOT to be included for SEP-1 Core Measure due to: 2+ SIRS criteria are not met    MEDICATIONS ADMINISTERED IN THE ED:  Medications   iopamidol (ISOVUE-300) 61 % injection 90 mL (90 mLs IntraVENous Given 1/29/25 2223)   sodium chloride 0.9 % bolus 1,000 mL (0 mLs IntraVENous Stopped 1/30/25 0044)   insulin regular (HumuLIN R;NovoLIN R) injection 5 Units (5 Units SubCUTAneous Given 1/29/25 2340)            Medical Decision Making     Differential diagnosis: Cellulitis, folliculitis, abscess, Bienvenido's, lymphitis   64-year-old male with history of essential hypertension, pancolitis, depression, type 2 diabetes, presented ambulatory to the ED with concerns of right sided groin pain with a lump.  Patient endorsed that have been going on for a week and noticed discharge for the past 4 days.  On physical

## 2025-07-11 ENCOUNTER — HOSPITAL ENCOUNTER (EMERGENCY)
Facility: HOSPITAL | Age: 65
Discharge: HOME | End: 2025-07-11
Attending: STUDENT IN AN ORGANIZED HEALTH CARE EDUCATION/TRAINING PROGRAM

## 2025-07-11 VITALS
OXYGEN SATURATION: 100 % | BODY MASS INDEX: 26.95 KG/M2 | RESPIRATION RATE: 16 BRPM | WEIGHT: 210 LBS | TEMPERATURE: 98 F | DIASTOLIC BLOOD PRESSURE: 106 MMHG | HEART RATE: 94 BPM | SYSTOLIC BLOOD PRESSURE: 142 MMHG | HEIGHT: 74 IN

## 2025-07-11 DIAGNOSIS — T24.221A PARTIAL THICKNESS BURN OF RIGHT KNEE, INITIAL ENCOUNTER: ICD-10-CM

## 2025-07-11 DIAGNOSIS — T24.222A PARTIAL THICKNESS BURN OF LEFT KNEE, INITIAL ENCOUNTER: Primary | ICD-10-CM

## 2025-07-11 DIAGNOSIS — L03.116 CELLULITIS OF LEFT LOWER EXTREMITY: ICD-10-CM

## 2025-07-11 PROCEDURE — 6370000000 HC RX 637 (ALT 250 FOR IP): Performed by: STUDENT IN AN ORGANIZED HEALTH CARE EDUCATION/TRAINING PROGRAM

## 2025-07-11 PROCEDURE — 2500000003 HC RX 250 WO HCPCS: Performed by: STUDENT IN AN ORGANIZED HEALTH CARE EDUCATION/TRAINING PROGRAM

## 2025-07-11 PROCEDURE — 99283 EMERGENCY DEPT VISIT LOW MDM: CPT

## 2025-07-11 RX ORDER — DOXYCYCLINE HYCLATE 100 MG
100 TABLET ORAL 2 TIMES DAILY
Qty: 14 TABLET | Refills: 0 | Status: SHIPPED | OUTPATIENT
Start: 2025-07-11 | End: 2025-07-18

## 2025-07-11 RX ORDER — SILVER SULFADIAZINE 10 MG/G
CREAM TOPICAL
Status: COMPLETED | OUTPATIENT
Start: 2025-07-11 | End: 2025-07-11

## 2025-07-11 RX ORDER — SILVER SULFADIAZINE 10 MG/G
CREAM TOPICAL
Qty: 50 G | Refills: 0 | Status: SHIPPED | OUTPATIENT
Start: 2025-07-11

## 2025-07-11 RX ORDER — DOXYCYCLINE 100 MG/1
100 CAPSULE ORAL
Status: COMPLETED | OUTPATIENT
Start: 2025-07-11 | End: 2025-07-11

## 2025-07-11 RX ADMIN — SILVER SULFADIAZINE: 10 CREAM TOPICAL at 19:00

## 2025-07-11 RX ADMIN — DOXYCYCLINE HYCLATE 100 MG: 100 CAPSULE ORAL at 19:01

## 2025-07-11 ASSESSMENT — PAIN - FUNCTIONAL ASSESSMENT: PAIN_FUNCTIONAL_ASSESSMENT: 0-10

## 2025-07-11 ASSESSMENT — PAIN DESCRIPTION - LOCATION: LOCATION: LEG;KNEE

## 2025-07-11 ASSESSMENT — PAIN SCALES - GENERAL: PAINLEVEL_OUTOF10: 7

## 2025-07-11 ASSESSMENT — PAIN DESCRIPTION - ORIENTATION: ORIENTATION: LEFT;RIGHT
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
